# Patient Record
Sex: MALE | Race: WHITE | Employment: FULL TIME | URBAN - METROPOLITAN AREA
[De-identification: names, ages, dates, MRNs, and addresses within clinical notes are randomized per-mention and may not be internally consistent; named-entity substitution may affect disease eponyms.]

---

## 2018-02-08 ENCOUNTER — OFFICE VISIT (OUTPATIENT)
Dept: FAMILY MEDICINE CLINIC | Age: 43
End: 2018-02-08
Payer: COMMERCIAL

## 2018-02-08 VITALS
OXYGEN SATURATION: 98 % | TEMPERATURE: 98.2 F | SYSTOLIC BLOOD PRESSURE: 126 MMHG | HEIGHT: 68 IN | WEIGHT: 197 LBS | DIASTOLIC BLOOD PRESSURE: 78 MMHG | BODY MASS INDEX: 29.86 KG/M2 | RESPIRATION RATE: 16 BRPM | HEART RATE: 67 BPM

## 2018-02-08 DIAGNOSIS — J06.9 ACUTE URI: Primary | ICD-10-CM

## 2018-02-08 PROCEDURE — 87804 INFLUENZA ASSAY W/OPTIC: CPT | Performed by: FAMILY MEDICINE

## 2018-02-08 PROCEDURE — 99213 OFFICE O/P EST LOW 20 MIN: CPT | Performed by: FAMILY MEDICINE

## 2018-02-08 RX ORDER — AZITHROMYCIN 250 MG/1
250 TABLET, FILM COATED ORAL DAILY
Qty: 6 TABLET | Refills: 0 | Status: SHIPPED | OUTPATIENT
Start: 2018-02-08 | End: 2018-02-13

## 2018-02-08 ASSESSMENT — ENCOUNTER SYMPTOMS
EYE DISCHARGE: 0
CHEST TIGHTNESS: 0
WHEEZING: 0
SINUS PRESSURE: 0
SHORTNESS OF BREATH: 0
VOICE CHANGE: 0
CONSTIPATION: 0
SORE THROAT: 0
EYE ITCHING: 0
VOMITING: 0
COUGH: 1
SINUS PAIN: 0
ABDOMINAL PAIN: 0
RHINORRHEA: 0
DIARRHEA: 0
EYE REDNESS: 0
NAUSEA: 0
BACK PAIN: 0
TROUBLE SWALLOWING: 0
HEMOPTYSIS: 0

## 2018-02-08 NOTE — PROGRESS NOTES
[address] [facility] WALK-IN FAMILY MEDICINE   [address] [facility]  [address] [facility] [address]  Dept: [phone]  Dept Fax: [phone]    Sherine Pulido is a 36 y.o. male who presents today for his medical conditions/complaints as noted below. Sherine Pulido is c/o of Cough (cough and congestion, ear fullness x 3 days)        HPI:     Cough   This is a new problem. The current episode started in the past 7 days (4 days ago). The problem has been unchanged. The problem occurs every few minutes. The cough is non-productive. Associated symptoms include ear congestion and a fever. Pertinent negatives include no chest pain, chills, ear pain, eye redness, headaches, hemoptysis, myalgias, nasal congestion, postnasal drip, rash, rhinorrhea, sore throat, shortness of breath, sweats or wheezing. Associated symptoms comments: Low grade temp. . Nothing aggravates the symptoms. He has tried nothing for the symptoms. There is no history of asthma, bronchitis, environmental allergies or pneumonia.        Past Medical History:   Diagnosis Date    Foot fracture, right 05/2016    right great toe fracture    Left hand fracture 05/2016    5th MCP      Past Surgical History:   Procedure Laterality Date    HAND SURGERY Left 05/18/2016    SINUS SURGERY  2012    nasal septum repair, nasal polyps    VASECTOMY  2011       Family History   Problem Relation Age of Onset    Other Mother      Myastenia Gravis, WPW, autoimmune disorder      Other Father      adopted    Other Brother      polymyositis       Social History   Substance Use Topics    Smoking status: Never Smoker    Smokeless tobacco: Never Used    Alcohol use Yes      Comment: occ      Current Outpatient Prescriptions   Medication Sig Dispense Refill    azithromycin (ZITHROMAX Z-TACOS) 250 MG tablet Take 1 tablet by mouth daily for 5 days Take 2 tablets by mouth on the first day and then one tablet daily for the next 4 days 6 tablet 0    sodium

## 2018-02-08 NOTE — PATIENT INSTRUCTIONS
limit fluids, talk with your doctor before you increase the amount of fluids you drink. · Take an over-the-counter pain medicine, such as acetaminophen (Tylenol), ibuprofen (Advil, Motrin), or naproxen (Aleve). Read and follow all instructions on the label. · Before you use cough and cold medicines, check the label. These medicines may not be safe for young children or for people with certain health problems. · Be careful when taking over-the-counter cold or flu medicines and Tylenol at the same time. Many of these medicines have acetaminophen, which is Tylenol. Read the labels to make sure that you are not taking more than the recommended dose. Too much acetaminophen (Tylenol) can be harmful. · Get plenty of rest.  · Do not smoke or allow others to smoke around you. If you need help quitting, talk to your doctor about stop-smoking programs and medicines. These can increase your chances of quitting for good. When should you call for help? Call 911 anytime you think you may need emergency care. For example, call if:  ? · You have severe trouble breathing. ?Call your doctor now or seek immediate medical care if:  ? · You seem to be getting much sicker. ? · You have new or worse trouble breathing. ? · You have a new or higher fever. ? · You have a new rash. ? Watch closely for changes in your health, and be sure to contact your doctor if:  ? · You have a new symptom, such as a sore throat, an earache, or sinus pain. ? · You cough more deeply or more often, especially if you notice more mucus or a change in the color of your mucus. ? · You do not get better as expected. Where can you learn more? Go to https://MyCosmikdharaeb.Meebler. org and sign in to your iVillage account. Enter Y458 in the Newshubby box to learn more about \"Upper Respiratory Infection (Cold): Care Instructions. \"     If you do not have an account, please click on the \"Sign Up Now\" link. Current as of:  May 12,

## 2018-07-05 ENCOUNTER — OFFICE VISIT (OUTPATIENT)
Dept: FAMILY MEDICINE CLINIC | Age: 43
End: 2018-07-05
Payer: COMMERCIAL

## 2018-07-05 VITALS
HEIGHT: 68 IN | DIASTOLIC BLOOD PRESSURE: 76 MMHG | SYSTOLIC BLOOD PRESSURE: 118 MMHG | OXYGEN SATURATION: 98 % | WEIGHT: 195 LBS | HEART RATE: 89 BPM | BODY MASS INDEX: 29.55 KG/M2 | TEMPERATURE: 98.4 F

## 2018-07-05 DIAGNOSIS — H66.90 ACUTE OTITIS MEDIA, UNSPECIFIED OTITIS MEDIA TYPE: ICD-10-CM

## 2018-07-05 DIAGNOSIS — R11.0 NAUSEA: Primary | ICD-10-CM

## 2018-07-05 LAB
INFLUENZA A ANTIBODY: NEGATIVE
INFLUENZA B ANTIBODY: NEGATIVE

## 2018-07-05 PROCEDURE — 1036F TOBACCO NON-USER: CPT | Performed by: FAMILY MEDICINE

## 2018-07-05 PROCEDURE — 99213 OFFICE O/P EST LOW 20 MIN: CPT | Performed by: FAMILY MEDICINE

## 2018-07-05 PROCEDURE — 87804 INFLUENZA ASSAY W/OPTIC: CPT | Performed by: FAMILY MEDICINE

## 2018-07-05 PROCEDURE — G8427 DOCREV CUR MEDS BY ELIG CLIN: HCPCS | Performed by: FAMILY MEDICINE

## 2018-07-05 PROCEDURE — G8419 CALC BMI OUT NRM PARAM NOF/U: HCPCS | Performed by: FAMILY MEDICINE

## 2018-07-05 RX ORDER — ONDANSETRON 4 MG/1
4 TABLET, FILM COATED ORAL EVERY 8 HOURS PRN
Qty: 8 TABLET | Refills: 0 | Status: SHIPPED | OUTPATIENT
Start: 2018-07-05 | End: 2018-07-13

## 2018-07-05 RX ORDER — ONDANSETRON 4 MG/1
4 TABLET, ORALLY DISINTEGRATING ORAL ONCE
Status: COMPLETED | OUTPATIENT
Start: 2018-07-05 | End: 2018-07-05

## 2018-07-05 RX ORDER — AZITHROMYCIN 250 MG/1
TABLET, FILM COATED ORAL
Qty: 1 PACKET | Refills: 0 | Status: SHIPPED | OUTPATIENT
Start: 2018-07-05 | End: 2018-07-15

## 2018-07-05 RX ADMIN — ONDANSETRON 4 MG: 4 TABLET, ORALLY DISINTEGRATING ORAL at 12:01

## 2018-07-05 ASSESSMENT — PATIENT HEALTH QUESTIONNAIRE - PHQ9
SUM OF ALL RESPONSES TO PHQ9 QUESTIONS 1 & 2: 0
1. LITTLE INTEREST OR PLEASURE IN DOING THINGS: 0
SUM OF ALL RESPONSES TO PHQ QUESTIONS 1-9: 0
2. FEELING DOWN, DEPRESSED OR HOPELESS: 0

## 2018-07-05 ASSESSMENT — ENCOUNTER SYMPTOMS
NAUSEA: 1
SINUS PAIN: 0
CHEST TIGHTNESS: 0
VISUAL CHANGE: 0
ABDOMINAL PAIN: 1
BACK PAIN: 0
SORE THROAT: 0
VOMITING: 0
COUGH: 0
WHEEZING: 0
SHORTNESS OF BREATH: 0
DIARRHEA: 0
RHINORRHEA: 0
CHANGE IN BOWEL HABIT: 0
SINUS PRESSURE: 0
EYES NEGATIVE: 1

## 2018-07-05 NOTE — PROGRESS NOTES
Negative for change in bowel habit, diarrhea and vomiting. Genitourinary: Negative. Musculoskeletal: Positive for myalgias. Negative for back pain and neck pain. Skin: Negative for rash. Neurological: Negative for dizziness, light-headedness and headaches. Objective:     Physical Exam   Constitutional: He is oriented to person, place, and time. He appears well-developed and well-nourished. No distress. HENT:   Head: Normocephalic. Right Ear: External ear normal. A middle ear effusion is present. Left Ear: External ear normal. Tympanic membrane is erythematous. A middle ear effusion is present. Nose: Nose normal.   Mouth/Throat: Oropharynx is clear and moist. No oropharyngeal exudate. Eyes: Conjunctivae and EOM are normal. Pupils are equal, round, and reactive to light. Right eye exhibits no discharge. Left eye exhibits no discharge. Neck: Normal range of motion. Neck supple. Cardiovascular: Normal rate, regular rhythm and normal heart sounds. No murmur heard. Pulmonary/Chest: Effort normal and breath sounds normal. No respiratory distress. He has no wheezes. He has no rales. Abdominal: Soft. Bowel sounds are normal. He exhibits no distension and no mass. There is no tenderness. Musculoskeletal: Normal range of motion. He exhibits no edema or tenderness. Lymphadenopathy:     He has no cervical adenopathy. Neurological: He is alert and oriented to person, place, and time. No cranial nerve deficit. Coordination normal.   Skin: Skin is warm. No rash noted. He is not diaphoretic. Psychiatric: He has a normal mood and affect. His behavior is normal. Judgment and thought content normal.   Nursing note and vitals reviewed. /76   Pulse 89   Temp 98.4 °F (36.9 °C) (Oral)   Ht 5' 7.99\" (1.727 m)   Wt 195 lb (88.5 kg)   SpO2 98%   BMI 29.66 kg/m²     Assessment:       Diagnosis Orders   1.  Nausea  POCT Influenza A/B    ondansetron (ZOFRAN-ODT) disintegrating tablet 4 mg

## 2018-07-05 NOTE — PATIENT INSTRUCTIONS
Patient Education        Nausea and Vomiting: Care Instructions  Your Care Instructions    When you are nauseated, you may feel weak and sweaty and notice a lot of saliva in your mouth. Nausea often leads to vomiting. Most of the time you do not need to worry about nausea and vomiting, but they can be signs of other illnesses. Two common causes of nausea and vomiting are stomach flu and food poisoning. Nausea and vomiting from viral stomach flu will usually start to improve within 24 hours. Nausea and vomiting from food poisoning may last from 12 to 48 hours. The doctor has checked you carefully, but problems can develop later. If you notice any problems or new symptoms, get medical treatment right away. Follow-up care is a key part of your treatment and safety. Be sure to make and go to all appointments, and call your doctor if you are having problems. It's also a good idea to know your test results and keep a list of the medicines you take. How can you care for yourself at home? · To prevent dehydration, drink plenty of fluids, enough so that your urine is light yellow or clear like water. Choose water and other caffeine-free clear liquids until you feel better. If you have kidney, heart, or liver disease and have to limit fluids, talk with your doctor before you increase the amount of fluids you drink. · Rest in bed until you feel better. · When you are able to eat, try clear soups, mild foods, and liquids until all symptoms are gone for 12 to 48 hours. Other good choices include dry toast, crackers, cooked cereal, and gelatin dessert, such as Jell-O. When should you call for help? Call 911 anytime you think you may need emergency care. For example, call if:    · You passed out (lost consciousness).    Call your doctor now or seek immediate medical care if:    · You have symptoms of dehydration, such as:  ¨ Dry eyes and a dry mouth. ¨ Passing only a little dark urine.   ¨ Feeling thirstier than usual.   · You have new or worsening belly pain.     · You have a new or higher fever.     · You vomit blood or what looks like coffee grounds.    Watch closely for changes in your health, and be sure to contact your doctor if:    · You have ongoing nausea and vomiting.     · Your vomiting is getting worse.     · Your vomiting lasts longer than 2 days.     · You are not getting better as expected. Where can you learn more? Go to https://WotopepicParticle Code.Extra Life. org and sign in to your TeamLease Services account. Enter [id number] in the Inway Studios box to learn more about \"Nausea and Vomiting: Care Instructions. \"     If you do not have an account, please click on the \"Sign Up Now\" link. Current as of: November 20, 2017  Content Version: 11.6  © 1596-6247 [employer]ical. Care instructions adapted under license by [facility] ([facility]). If you have questions about a medical condition or this instruction, always ask your healthcare professional. [place] [address] any warranty or liability for your use of this information. Patient Education        Ear Infection (Otitis Media): Care Instructions  Your Care Instructions    An ear infection may start with a cold and affect the middle ear (otitis media). It can hurt a lot. Most ear infections clear up on their own in a couple of days. Most often you will not need antibiotics. This is because many ear infections are caused by a virus. Antibiotics don't work against a virus. Regular doses of pain medicines are the best way to reduce your fever and help you feel better. Follow-up care is a key part of your treatment and safety. Be sure to make and go to all appointments, and call your doctor if you are having problems. It's also a good idea to know your test results and keep a list of the medicines you take. How can you care for yourself at home? · Take pain medicines exactly as directed.   ¨ If the doctor gave you a prescription medicine for pain, take it as prescribed. ¨ If you are not taking a prescription pain medicine, take an over-the-counter medicine, such as acetaminophen (Tylenol), ibuprofen (Advil, Motrin), or naproxen (Aleve). Read and follow all instructions on the label. ¨ Do not take two or more pain medicines at the same time unless the doctor told you to. Many pain medicines have acetaminophen, which is Tylenol. Too much acetaminophen (Tylenol) can be harmful. · Plan to take a full dose of pain reliever before bedtime. Getting enough sleep will help you get better. · Try a warm, moist washcloth on the ear. It may help relieve pain. · If your doctor prescribed antibiotics, take them as directed. Do not stop taking them just because you feel better. You need to take the full course of antibiotics. When should you call for help? Call your doctor now or seek immediate medical care if:    · You have new or increasing ear pain.     · You have new or increasing pus or blood draining from your ear.     · You have a fever with a stiff neck or a severe headache.    Watch closely for changes in your health, and be sure to contact your doctor if:    · You have new or worse symptoms.     · You are not getting better after taking an antibiotic for 2 days. Where can you learn more? Go to https://Safeguard Interactive.Accelerated Vision Group. org and sign in to your Mix & Meet account. Enter N644 in the [facility] box to learn more about \"Ear Infection (Otitis Media): Care Instructions. \"     If you do not have an account, please click on the \"Sign Up Now\" link. Current as of: May 12, 2017  Content Version: 11.6  © 6178-2195 Gowalla, Incorporated. Care instructions adapted under license by [facility] ([facility]). If you have questions about a medical condition or this instruction, always ask your healthcare professional. [place] [address] any warranty or liability for your use of this information.      Please go to the Emergency department if you feel worse.

## 2018-07-05 NOTE — LETTER
[place] [address] [place] [address]  Nurys Smith  Phone: [phone]  Fax: [phone]    Teresa Louis MD        July 5, 2018     Patient: Dilshad Patel   YOB: 1975   Date of Visit: 7/5/2018       To Whom It May Concern: It is my medical opinion that Irvin Talamantes should remain out of work on 7/5/18 through 7/6/18. If you have any questions or concerns, please don't hesitate to call.     Sincerely,        Teresa Louis MD

## 2019-04-18 ENCOUNTER — OFFICE VISIT (OUTPATIENT)
Dept: FAMILY MEDICINE CLINIC | Age: 44
End: 2019-04-18
Payer: COMMERCIAL

## 2019-04-18 VITALS
RESPIRATION RATE: 16 BRPM | HEART RATE: 67 BPM | TEMPERATURE: 98.3 F | BODY MASS INDEX: 29.7 KG/M2 | OXYGEN SATURATION: 95 % | HEIGHT: 68 IN | DIASTOLIC BLOOD PRESSURE: 74 MMHG | SYSTOLIC BLOOD PRESSURE: 124 MMHG | WEIGHT: 196 LBS

## 2019-04-18 DIAGNOSIS — M77.11 LATERAL EPICONDYLITIS OF RIGHT ELBOW: Primary | ICD-10-CM

## 2019-04-18 PROCEDURE — 1036F TOBACCO NON-USER: CPT | Performed by: FAMILY MEDICINE

## 2019-04-18 PROCEDURE — 99213 OFFICE O/P EST LOW 20 MIN: CPT | Performed by: FAMILY MEDICINE

## 2019-04-18 PROCEDURE — G8427 DOCREV CUR MEDS BY ELIG CLIN: HCPCS | Performed by: FAMILY MEDICINE

## 2019-04-18 PROCEDURE — G8419 CALC BMI OUT NRM PARAM NOF/U: HCPCS | Performed by: FAMILY MEDICINE

## 2019-04-18 RX ORDER — METHYLPREDNISOLONE 4 MG/1
TABLET ORAL
Qty: 1 KIT | Refills: 0 | Status: SHIPPED | OUTPATIENT
Start: 2019-04-18 | End: 2020-02-06 | Stop reason: ALTCHOICE

## 2019-04-18 ASSESSMENT — ENCOUNTER SYMPTOMS
ABDOMINAL PAIN: 0
EYES NEGATIVE: 1
CHEST TIGHTNESS: 0
BACK PAIN: 0
WHEEZING: 0
COUGH: 0
DIARRHEA: 0
SHORTNESS OF BREATH: 0
NAUSEA: 0
VOMITING: 0

## 2019-04-18 ASSESSMENT — PATIENT HEALTH QUESTIONNAIRE - PHQ9
SUM OF ALL RESPONSES TO PHQ QUESTIONS 1-9: 0
2. FEELING DOWN, DEPRESSED OR HOPELESS: 0
SUM OF ALL RESPONSES TO PHQ9 QUESTIONS 1 & 2: 0
SUM OF ALL RESPONSES TO PHQ QUESTIONS 1-9: 0
1. LITTLE INTEREST OR PLEASURE IN DOING THINGS: 0

## 2019-04-18 NOTE — PROGRESS NOTES
[address] [facility] WALK-IN FAMILY MEDICINE   [address] [facility] [address]  Dept: [phone]  Dept Fax: [phone]    Elroy Reed is a 39 y.o. male who presents today for his medicalconditions/complaints as noted below. Elroy Reed is c/o of Arm Pain (rt arm ( elbow area ) pain x two months - constant pain when moving - no known injury )        HPI:      Right handed. Arm Pain    Incident onset: 4 months. There was no injury mechanism. The pain is present in the right elbow. The quality of the pain is described as aching. The pain does not radiate. The pain is at a severity of 7/10. The pain is moderate. Pain course: No pain when not in motion. Pertinent negatives include no chest pain, muscle weakness, numbness or tingling. The symptoms are aggravated by movement, lifting and palpation. The treatment provided no relief. Past Medical History:   Diagnosis Date    Foot fracture, right 05/2016    right great toe fracture    Left hand fracture 05/2016    5th MCP      Past Surgical History:   Procedure Laterality Date    HAND SURGERY Left 05/18/2016    SINUS SURGERY  2012    nasal septum repair, nasal polyps    VASECTOMY  2011       Family History   Problem Relation Age of Onset    Other Mother         Myastenia Gravis, WPW, autoimmune disorder      Other Father         adopted    Other Brother         polymyositis       Social History     Tobacco Use    Smoking status: Never Smoker    Smokeless tobacco: Never Used   Substance Use Topics    Alcohol use: Yes     Comment: occ      Current Outpatient Medications   Medication Sig Dispense Refill    methylPREDNISolone (MEDROL, TACOS,) 4 MG tablet Take by mouth. 1 kit 0     No current facility-administered medications for this visit.       No Known Allergies       Health Maintenance   Topic Date Due    HIV screen  04/26/1989    DTaP/Tdap/Td vaccine (1 - Tdap) 04/26/1993    Lipid screen  04/26/2014    is warm. No rash noted. He is not diaphoretic. Psychiatric: He has a normal mood and affect. His behavior is normal. Judgment and thought content normal.   Nursing note and vitals reviewed. /74 (Site: Left Upper Arm, Position: Sitting, Cuff Size: Medium Adult)   Pulse 67   Temp 98.3 °F (36.8 °C) (Oral)   Resp 16   Ht 5' 8\" (1.727 m)   Wt 196 lb (88.9 kg)   SpO2 95%   BMI 29.80 kg/m²     Assessment:       Diagnosis Orders   1. Lateral epicondylitis of right elbow  methylPREDNISolone (MEDROL, TACOS,) 4 MG tablet       Plan:    Consider surgical consult for neck lump. He was advised to see an orthopedic specialist and have Xrays if his elbow  symptoms persisted. An elbow brace support was advised. A trial of hot and cold packs was advised. No orders of the defined types were placed in this encounter. Orders Placed This Encounter   Medications    methylPREDNISolone (MEDROL, TACOS,) 4 MG tablet     Sig: Take by mouth. Dispense:  1 kit     Refill:  0      Patient given educational materials - see patient instructions. Discusseduse, benefit, and side effects of prescribed medications. All patient questionsanswered. Pt voiced understanding. Reviewed health maintenance. Instructed tocontinue current medications, diet and exercise. Patient agreed with treatmentplan. Follow up as directed.      Electronically signed by Joshua Shepard MD on 4/18/2019 at 11:51 AM

## 2019-04-18 NOTE — PATIENT INSTRUCTIONS
Patient Education        Tennis Elbow: Care Instructions  Your Care Instructions    Tennis elbow is soreness or pain on the outer part of the elbow. The pain occurs when the tendon is stretched and becomes irritated by repeated twisting of the hand, wrist, and forearm. A tendon is a tough tissue that connects muscle to bone. This injury is common in tennis players. But you also can get it from many activities that work the same muscles. Examples include gardening, painting, and using tools. Tennis elbow usually heals with rest and treatment at home. Follow-up care is a key part of your treatment and safety. Be sure to make and go to all appointments, and call your doctor if you are having problems. It's also a good idea to know your test results and keep a list of the medicines you take. How can you care for yourself at home?    · Rest your fingers, wrist, and forearm. Try to stop or reduce any activity that causes elbow pain. You may have to rest your arm for weeks to months. Follow your doctor's directions for how long to rest.     · Put ice or a cold pack on your elbow for 10 to 20 minutes at a time. Try to do this every 1 to 2 hours for the next 3 days (when you are awake) or until the swelling goes down. Put a thin cloth between the ice and your skin.     · If your doctor gave you a brace or splint, use it as directed. A \"counterforce\" brace is a strap around your forearm, just below your elbow. It may ease the pressure on the tendon and spread force throughout your arm.     · Prop up your elbow on pillows to help reduce swelling.     · Follow your doctor's or physical therapist's directions for exercise.     · Return to your usual activities slowly.     · Try to prevent the problem. Learn the best techniques for your sport. For example, make sure the  on your tennis racquet is not too big for your hand.  Try not to hit a tennis ball late in your swing.     · Think about asking your employer about new ways of doing your job if your elbow pain is caused by something you do at work. Medicines    · Be safe with medicines. Read and follow all instructions on the label. ? If the doctor gave you a prescription medicine for pain, take it as prescribed. ? If you are not taking a prescription pain medicine, ask your doctor if you can take an over-the-counter medicine. When should you call for help? Call your doctor now or seek immediate medical care if:    · Your pain is worse.     · You cannot bend your elbow normally.     · Your arm or hand is cool or pale or changes color.     · You have tingling, weakness, or numbness in your hand and fingers.    Watch closely for changes in your health, and be sure to contact your doctor if:    · You have work problems caused by your elbow pain.     · Your pain is not better after 2 weeks. Where can you learn more? Go to https://TakklepeCloakroomeb.Social Pulse. org and sign in to your Vixar account. Enter 0699 [id number] [phone] in the Criteo box to learn more about \"Tennis Elbow: Care Instructions. \"     If you do not have an account, please click on the \"Sign Up Now\" link. Current as of: September 20, 2018  Content Version: 11.9  © 5946-3319 Reimage, Incorporated. Care instructions adapted under license by [facility] ([facility]). If you have questions about a medical condition or this instruction, always ask your healthcare professional. [place] [address] any warranty or liability for your use of this information. Patient Education         Tennis Elbow: Exercises  Your Care Instructions  Here are some examples of typical rehabilitation exercises for your condition. Start each exercise slowly. Ease off the exercise if you start to have pain. Your doctor or physical therapist will tell you when you can start these exercises and which ones will work best for you. How to do the exercises  Wrist flexor stretch    1.  Extend your arm in front of you with your palm up. 2. Bend your wrist, pointing your hand toward the floor. 3. With your other hand, gently bend your wrist farther until you feel a mild to moderate stretch in your forearm. 4. Hold for at least 15 to 30 seconds. Repeat 2 to 4 times. Wrist extensor stretch    1. Repeat steps 1 to 4 of the stretch above but begin with your extended hand palm down. Ball or sock squeeze    1. Hold a tennis ball (or a rolled-up sock) in your hand. 2. Make a fist around the ball (or sock) and squeeze. 3. Hold for about 6 seconds, and then relax for up to 10 seconds. 4. Repeat 8 to 12 times. 5. Switch the ball (or sock) to your other hand and do 8 to 12 times. Wrist deviation    1. Sit so that your arm is supported but your hand hangs off the edge of a flat surface, such as a table. 2. Hold your hand out like you are shaking hands with someone. 3. Move your hand up and down. 4. Repeat this motion 8 to 12 times. 5. Switch arms. 6. Try to do this exercise twice with each hand. Wrist curls    1. Place your forearm on a table with your hand hanging over the edge of the table, palm up. 2. Place a 1- to 2-pound weight in your hand. This may be a dumbbell, a can of food, or a filled water bottle. 3. Slowly raise and lower the weight while keeping your forearm on the table and your palm facing up. 4. Repeat this motion 8 to 12 times. 5. Switch arms, and do steps 1 through 4.  6. Repeat with your hand facing down toward the floor. Switch arms. Biceps curls    1. Sit leaning forward with your legs slightly spread and your left hand on your left thigh. 2. Place your right elbow on your right thigh, and hold the weight with your forearm horizontal.  3. Slowly curl the weight up and toward your chest.  4. Repeat this motion 8 to 12 times. 5. Switch arms, and do steps 1 through 4. Follow-up care is a key part of your treatment and safety.  Be sure to make and go to all appointments, and call your doctor if you are having problems. It's also a good idea to know your test results and keep a list of the medicines you take. Where can you learn more? Go to https://chpepiceweb.Nevro. org and sign in to your OpenFint account. Enter A925 in the Citylabs box to learn more about \"Tennis Elbow: Exercises. \"     If you do not have an account, please click on the \"Sign Up Now\" link. Current as of: September 20, 2018  Content Version: 11.9  © 6361-0752 [employer] Office Energy [employer], Incorporated. Care instructions adapted under license by [facility] ([facility]). If you have questions about a medical condition or this instruction, always ask your healthcare professional. [address] any warranty or liability for your use of this information.

## 2020-02-06 ENCOUNTER — OFFICE VISIT (OUTPATIENT)
Dept: FAMILY MEDICINE CLINIC | Age: 45
End: 2020-02-06
Payer: COMMERCIAL

## 2020-02-06 VITALS
OXYGEN SATURATION: 98 % | WEIGHT: 185 LBS | RESPIRATION RATE: 16 BRPM | HEIGHT: 68 IN | HEART RATE: 71 BPM | BODY MASS INDEX: 28.04 KG/M2 | TEMPERATURE: 98.1 F | SYSTOLIC BLOOD PRESSURE: 133 MMHG | DIASTOLIC BLOOD PRESSURE: 86 MMHG

## 2020-02-06 PROCEDURE — G8419 CALC BMI OUT NRM PARAM NOF/U: HCPCS | Performed by: FAMILY MEDICINE

## 2020-02-06 PROCEDURE — G8427 DOCREV CUR MEDS BY ELIG CLIN: HCPCS | Performed by: FAMILY MEDICINE

## 2020-02-06 PROCEDURE — 1036F TOBACCO NON-USER: CPT | Performed by: FAMILY MEDICINE

## 2020-02-06 PROCEDURE — 99213 OFFICE O/P EST LOW 20 MIN: CPT | Performed by: FAMILY MEDICINE

## 2020-02-06 PROCEDURE — G8484 FLU IMMUNIZE NO ADMIN: HCPCS | Performed by: FAMILY MEDICINE

## 2020-02-06 RX ORDER — METHYLPREDNISOLONE 4 MG/1
TABLET ORAL
Qty: 1 KIT | Refills: 0 | Status: SHIPPED | OUTPATIENT
Start: 2020-02-06 | End: 2022-01-20 | Stop reason: ALTCHOICE

## 2020-02-06 RX ORDER — TRIAMCINOLONE ACETONIDE 0.25 MG/G
CREAM TOPICAL
Qty: 80 G | Refills: 0 | Status: SHIPPED | OUTPATIENT
Start: 2020-02-06 | End: 2022-03-16

## 2020-02-06 ASSESSMENT — ENCOUNTER SYMPTOMS
FACIAL SWELLING: 1
DIARRHEA: 0
BACK PAIN: 0
RHINORRHEA: 0
EYE REDNESS: 0
EYE ITCHING: 0
EYE DISCHARGE: 0
EYE PAIN: 0
SINUS PRESSURE: 0
WHEEZING: 0
SHORTNESS OF BREATH: 0
SINUS PAIN: 0
SORE THROAT: 0
CHEST TIGHTNESS: 0
ABDOMINAL PAIN: 0
NAUSEA: 0
VOMITING: 0
COUGH: 0

## 2020-02-06 ASSESSMENT — PATIENT HEALTH QUESTIONNAIRE - PHQ9
SUM OF ALL RESPONSES TO PHQ QUESTIONS 1-9: 0
SUM OF ALL RESPONSES TO PHQ9 QUESTIONS 1 & 2: 0
SUM OF ALL RESPONSES TO PHQ QUESTIONS 1-9: 0
1. LITTLE INTEREST OR PLEASURE IN DOING THINGS: 0
2. FEELING DOWN, DEPRESSED OR HOPELESS: 0

## 2020-02-06 NOTE — PATIENT INSTRUCTIONS
Patient Education        Rash: Care Instructions  Your Care Instructions  A rash is any irritation or inflammation of the skin. Rashes have many possible causes, including allergy, infection, illness, heat, and emotional stress. Follow-up care is a key part of your treatment and safety. Be sure to make and go to all appointments, and call your doctor if you are having problems. It's also a good idea to know your test results and keep a list of the medicines you take. How can you care for yourself at home? · Wash the area with water only. Soap can make dryness and itching worse. Pat dry. · Put cold, wet cloths on the rash to reduce itching. · Keep cool, and stay out of the sun. · Leave the rash open to the air as much of the time as possible. · Sometimes petroleum jelly (Vaseline) can help relieve the discomfort caused by a rash. A moisturizing lotion, such as Cetaphil, also may help. Calamine lotion may help for rashes caused by contact with something (such as a plant or soap) that irritated the skin. Use it 3 or 4 times a day. · If your doctor prescribed a cream, use it as directed. If your doctor prescribed medicine, take it exactly as directed. · If your rash itches so badly that it interferes with your normal activities, take an over-the-counter antihistamine, such as diphenhydramine (Benadryl) or loratadine (Claritin). Read and follow all instructions on the label. When should you call for help? Call your doctor now or seek immediate medical care if:    · You have signs of infection, such as:  ? Increased pain, swelling, warmth, or redness. ? Red streaks leading from the area. ? Pus draining from the area. ? A fever.     · You have joint pain along with the rash.    Watch closely for changes in your health, and be sure to contact your doctor if:    · Your rash is changing or getting worse. For example, call if you have pain along with the rash, the rash is spreading, or you have new blisters.   · You do not get better after 1 week. Where can you learn more? Go to https://chpepiceweb.[employer]. org and sign in to your [employer] account. Enter Q462 in the [facility] box to learn more about \"Rash: Care Instructions. \"     If you do not have an account, please click on the \"Sign Up Now\" link. Current as of: April 1, 2019  Content Version: 12.3  © 7371-7409 Healthwise, Open[employer]. Care instructions adapted under license by [facility] ([facility]). If you have questions about a medical condition or this instruction, always ask your healthcare professional. [address] any warranty or liability for your use of this information. Patient Education        Hives: Care Instructions  Your Care Instructions  Hives are raised, red, itchy patches of skin. They are also called wheals or welts. They usually have red borders and pale centers. Hives range in size from ¼ inch to 3 inches or more across. They may seem to move from place to place on the skin. Several hives may form a large area of raised, red skin. You can get hives after an insect sting, after taking medicine or eating certain foods, or because of infection or stress. Other causes include plants, things you breathe in, makeup, heat, cold, sunlight, and latex. You cannot spread hives to other people. Hives may last a few minutes or a few days, but a single spot may last less than 36 hours. Follow-up care is a key part of your treatment and safety. Be sure to make and go to all appointments, and call your doctor if you are having problems. It's also a good idea to know your test results and keep a list of the medicines you take. How can you care for yourself at home? · Avoid whatever you think may have caused your hives, such as a certain food or medicine. However, you may not know the cause. · Put a cool, wet towel on the area to relieve itching.   · Take an over-the-counter antihistamine, such as diphenhydramine (Benadryl), cetirizine (Zyrtec), or loratadine (Claritin), to help stop the hives and calm the itching. Read and follow directions on the label. These medicines can make you feel sleepy. Do not drive while using them. · Stay away from strong soaps, detergents, and chemicals. These can make itching worse. When should you call for help? Call 911 anytime you think you may need emergency care. For example, call if:    · You have symptoms of a severe allergic reaction. These may include:  ? Sudden raised, red areas (hives) all over your body. ? Swelling of the throat, mouth, lips, or tongue. ? Trouble breathing. ? Passing out (losing consciousness). Or you may feel very lightheaded or suddenly feel weak, confused, or restless.    Call your doctor now or seek immediate medical care if:    · You have symptoms of an allergic reaction, such as:  ? A rash or hives (raised, red areas on the skin). ? Itching. ? Swelling. ? Belly pain, nausea, or vomiting.     · You get hives after you start a new medicine.     · Hives have not gone away after 24 hours.    Watch closely for changes in your health, and be sure to contact your doctor if:    · You do not get better as expected. Where can you learn more? Go to https://Lightning Gamingpepiceweb.Littlecast. org and sign in to your [employer] account. Enter Y577 in the [employer] box to learn more about \"Hives: Care Instructions. \"     If you do not have an account, please click on the \"Sign Up Now\" link. Current as of: June 26, 2019  Content Version: 12.3  © 7826-8410 Healthwise, Incorporated. Care instructions adapted under license by [facility] ([facility]). If you have questions about a medical condition or this instruction, always ask your healthcare professional. [address] any warranty or liability for your use of this information.

## 2020-02-06 NOTE — PROGRESS NOTES
Used   Substance Use Topics    Alcohol use: Yes     Comment: occ      Current Outpatient Medications   Medication Sig Dispense Refill    methylPREDNISolone (MEDROL, TACOS,) 4 MG tablet Take by mouth. 1 kit 0    triamcinolone (KENALOG) 0.025 % cream Apply Topically twice a day as needed 80 g 0     No current facility-administered medications for this visit. No Known Allergies       Health Maintenance   Topic Date Due    DTaP/Tdap/Td vaccine (1 - Tdap) 04/26/1985    HIV screen  04/26/1989    Lipid screen  04/26/2014    Diabetes screen  04/26/2014    Flu vaccine (1) 09/01/2019    Shingles Vaccine (1 of 2) 04/26/2024    Hepatitis A vaccine  Aged Out    Hepatitis B vaccine  Aged Out    Hib vaccine  Aged Out    Meningococcal (ACWY) vaccine  Aged Out    Pneumococcal 0-64 years Vaccine  Aged Out       Subjective:      Review of Systems   Constitutional: Negative for appetite change, chills, diaphoresis, fatigue and fever. HENT: Positive for facial swelling. Negative for congestion, ear pain, rhinorrhea, sinus pressure, sinus pain, sneezing, sore throat and tinnitus. Mild swelling/puffiness cheeks. No tongue swelling. Eyes: Negative for pain, discharge, redness, itching and visual disturbance. Respiratory: Negative for cough, chest tightness, shortness of breath and wheezing. Cardiovascular: Negative for chest pain and palpitations. Gastrointestinal: Negative for abdominal pain, diarrhea, nausea and vomiting. Genitourinary: Negative. Musculoskeletal: Negative for back pain, joint pain, myalgias and neck pain. Skin: Positive for rash. Neurological: Negative for dizziness, light-headedness and headaches. Objective:      Physical Exam  Vitals signs reviewed. Constitutional:       General: He is not in acute distress. Appearance: He is well-developed. He is not diaphoretic. HENT:      Head: Normocephalic.       Right Ear: External ear normal.      Left Ear: External ear

## 2021-02-01 ENCOUNTER — HOSPITAL ENCOUNTER (OUTPATIENT)
Facility: CLINIC | Age: 46
Discharge: HOME | End: 2021-02-03
Payer: COMMERCIAL

## 2021-02-01 ENCOUNTER — OFFICE VISIT (OUTPATIENT)
Dept: FAMILY MEDICINE CLINIC | Age: 46
End: 2021-02-01
Payer: COMMERCIAL

## 2021-02-01 ENCOUNTER — HOSPITAL ENCOUNTER (OUTPATIENT)
Dept: GENERAL RADIOLOGY | Facility: CLINIC | Age: 46
Discharge: HOME | End: 2021-02-03
Payer: COMMERCIAL

## 2021-02-01 VITALS
HEART RATE: 75 BPM | SYSTOLIC BLOOD PRESSURE: 142 MMHG | BODY MASS INDEX: 27.4 KG/M2 | HEIGHT: 69 IN | DIASTOLIC BLOOD PRESSURE: 85 MMHG | WEIGHT: 185 LBS | OXYGEN SATURATION: 97 % | TEMPERATURE: 97.3 F

## 2021-02-01 DIAGNOSIS — M54.41 CHRONIC RIGHT-SIDED LOW BACK PAIN WITH RIGHT-SIDED SCIATICA: ICD-10-CM

## 2021-02-01 DIAGNOSIS — M54.41 ACUTE RIGHT-SIDED LOW BACK PAIN WITH RIGHT-SIDED SCIATICA: Primary | ICD-10-CM

## 2021-02-01 DIAGNOSIS — M54.41 ACUTE RIGHT-SIDED LOW BACK PAIN WITH RIGHT-SIDED SCIATICA: ICD-10-CM

## 2021-02-01 DIAGNOSIS — G89.29 CHRONIC RIGHT-SIDED LOW BACK PAIN WITH RIGHT-SIDED SCIATICA: ICD-10-CM

## 2021-02-01 PROCEDURE — G8419 CALC BMI OUT NRM PARAM NOF/U: HCPCS | Performed by: NURSE PRACTITIONER

## 2021-02-01 PROCEDURE — 72100 X-RAY EXAM L-S SPINE 2/3 VWS: CPT

## 2021-02-01 PROCEDURE — 99214 OFFICE O/P EST MOD 30 MIN: CPT | Performed by: NURSE PRACTITIONER

## 2021-02-01 PROCEDURE — G8484 FLU IMMUNIZE NO ADMIN: HCPCS | Performed by: NURSE PRACTITIONER

## 2021-02-01 PROCEDURE — 96372 THER/PROPH/DIAG INJ SC/IM: CPT | Performed by: NURSE PRACTITIONER

## 2021-02-01 PROCEDURE — 1036F TOBACCO NON-USER: CPT | Performed by: NURSE PRACTITIONER

## 2021-02-01 PROCEDURE — G8427 DOCREV CUR MEDS BY ELIG CLIN: HCPCS | Performed by: NURSE PRACTITIONER

## 2021-02-01 RX ORDER — TRIAMCINOLONE ACETONIDE 40 MG/ML
40 INJECTION, SUSPENSION INTRA-ARTICULAR; INTRAMUSCULAR ONCE
Status: COMPLETED | OUTPATIENT
Start: 2021-02-01 | End: 2021-02-01

## 2021-02-01 RX ORDER — KETOROLAC TROMETHAMINE 30 MG/ML
30 INJECTION, SOLUTION INTRAMUSCULAR; INTRAVENOUS ONCE
Status: COMPLETED | OUTPATIENT
Start: 2021-02-01 | End: 2021-02-01

## 2021-02-01 RX ORDER — CYCLOBENZAPRINE HCL 10 MG
10 TABLET ORAL NIGHTLY PRN
Qty: 10 TABLET | Refills: 0 | Status: SHIPPED | OUTPATIENT
Start: 2021-02-01 | End: 2021-02-11

## 2021-02-01 RX ORDER — PREDNISONE 20 MG/1
40 TABLET ORAL DAILY
Qty: 10 TABLET | Refills: 0 | Status: SHIPPED | OUTPATIENT
Start: 2021-02-01 | End: 2021-02-06

## 2021-02-01 RX ADMIN — KETOROLAC TROMETHAMINE 30 MG: 30 INJECTION, SOLUTION INTRAMUSCULAR; INTRAVENOUS at 13:52

## 2021-02-01 RX ADMIN — TRIAMCINOLONE ACETONIDE 40 MG: 40 INJECTION, SUSPENSION INTRA-ARTICULAR; INTRAMUSCULAR at 13:56

## 2021-02-01 RX ADMIN — KETOROLAC TROMETHAMINE 30 MG: 30 INJECTION, SOLUTION INTRAMUSCULAR; INTRAVENOUS at 13:54

## 2021-02-01 ASSESSMENT — PATIENT HEALTH QUESTIONNAIRE - PHQ9
SUM OF ALL RESPONSES TO PHQ QUESTIONS 1-9: 0
1. LITTLE INTEREST OR PLEASURE IN DOING THINGS: 0
2. FEELING DOWN, DEPRESSED OR HOPELESS: 0
SUM OF ALL RESPONSES TO PHQ9 QUESTIONS 1 & 2: 0

## 2021-02-01 ASSESSMENT — ENCOUNTER SYMPTOMS
BACK PAIN: 1
EYE DISCHARGE: 0
SHORTNESS OF BREATH: 0
ALLERGIC/IMMUNOLOGIC NEGATIVE: 1
VOMITING: 0
SORE THROAT: 0
EYES NEGATIVE: 1
COUGH: 0
DIARRHEA: 0
EYE ITCHING: 0
NAUSEA: 0
ABDOMINAL PAIN: 0
CHEST TIGHTNESS: 0

## 2021-02-01 NOTE — PROGRESS NOTES
[address] [facility]   [facility] Preeti Morel  Dept: [phone]  Dept Fax: [phone]    Robin Travis is a 54 y.o. male who presents today forhis medical conditions/complaints as noted below. Robin Travis is c/o of   Chief Complaint   Patient presents with    Back Pain     back pain,right side of back radiating down right leg,x4 days     HPI:     Back Pain  This is a recurrent problem. The current episode started in the past 7 days (x's 4 days ). The problem occurs constantly. The problem has been gradually worsening since onset. The pain is present in the lumbar spine. The quality of the pain is described as aching and shooting. The pain radiates to the right thigh. The pain is at a severity of 7/10. The pain is moderate. The symptoms are aggravated by lying down, position and standing. Pertinent negatives include no abdominal pain, chest pain, dysuria, fever, headaches or weakness. He has tried NSAIDs and home exercises for the symptoms. The treatment provided mild relief. Developed back pain x's 6 weeks ago playing basketball. Pain subsided. About 4 days developed increased lower back pain. Having shooting pain to lower R leg. Constant dull pain. Intermittent shooting. Denies any numbness or tingling   Relief sitting in a recliner. Taking Ibuprofen PRN- no relief with taking. Did an inversion table yesterday with relief for a few hours and pain came back. Increases with standing and laying down. HX back injury 18-20 years ago due to heavy lifting.      Denies any imaging in the past.     Past Medical History:   Diagnosis Date    Foot fracture, right 05/2016    right great toe fracture    Left hand fracture 05/2016    5th MCP      Past Surgical History:   Procedure Laterality Date    HAND SURGERY Left 05/18/2016    SINUS SURGERY  2012    nasal septum repair, nasal polyps    VASECTOMY  2011       Family History Problem Relation Age of Onset    Other Mother         Myastenia Gravis, WPW, autoimmune disorder      Other Father         adopted    Other Brother         polymyositis       Social History     Tobacco Use    Smoking status: Never Smoker    Smokeless tobacco: Never Used   Substance Use Topics    Alcohol use: Yes     Comment: occ      Current Outpatient Medications   Medication Sig Dispense Refill    predniSONE (DELTASONE) 20 MG tablet Take 2 tablets by mouth daily for 5 days 10 tablet 0    cyclobenzaprine (FLEXERIL) 10 MG tablet Take 1 tablet by mouth nightly as needed for Muscle spasms 10 tablet 0    methylPREDNISolone (MEDROL, TACOS,) 4 MG tablet Take by mouth. (Patient not taking: Reported on 2/1/2021) 1 kit 0    triamcinolone (KENALOG) 0.025 % cream Apply Topically twice a day as needed (Patient not taking: Reported on 2/1/2021) 80 g 0     No current facility-administered medications for this visit. No Known Allergies        Subjective:      Review of Systems   Constitutional: Negative for appetite change, chills, fatigue and fever. HENT: Negative for congestion, postnasal drip and sore throat. Eyes: Negative. Negative for discharge and itching. Respiratory: Negative for cough, chest tightness and shortness of breath. Cardiovascular: Negative for chest pain, palpitations and leg swelling. Gastrointestinal: Negative for abdominal pain, diarrhea, nausea and vomiting. Endocrine: Negative. Genitourinary: Negative for dysuria, frequency and urgency. Musculoskeletal: Positive for back pain. Negative for neck pain and neck stiffness. Lower back pain with radiation down R upper leg    Skin: Negative for rash. Allergic/Immunologic: Negative. Neurological: Negative for dizziness, weakness, light-headedness and headaches. Hematological: Negative for adenopathy. Psychiatric/Behavioral: Negative for confusion. Objective:      Physical Exam  Vitals signs reviewed. Constitutional:       Appearance: He is well-developed. HENT:      Head: Normocephalic. Right Ear: External ear normal.      Left Ear: External ear normal.      Nose: Nose normal.   Eyes:      Conjunctiva/sclera: Conjunctivae normal.      Pupils: Pupils are equal, round, and reactive to light. Neck:      Musculoskeletal: Normal range of motion and neck supple. Cardiovascular:      Rate and Rhythm: Normal rate and regular rhythm. Heart sounds: Normal heart sounds. No murmur. No friction rub. No gallop. Pulmonary:      Effort: Pulmonary effort is normal. No respiratory distress. Breath sounds: Normal breath sounds. Abdominal:      General: Bowel sounds are normal.      Palpations: Abdomen is soft. Musculoskeletal: Normal range of motion. Lumbar back: He exhibits tenderness. Lymphadenopathy:      Cervical: No cervical adenopathy. Skin:     General: Skin is warm and dry. Findings: No rash. Neurological:      Mental Status: He is alert and oriented to person, place, and time. Cranial Nerves: No cranial nerve deficit. Coordination: Coordination normal.      Deep Tendon Reflexes: Reflexes are normal and symmetric. Psychiatric:         Thought Content: Thought content normal.       BP (!) 142/85   Pulse 75   Temp 97.3 °F (36.3 °C) (Infrared)   Ht 5' 9\" (1.753 m)   Wt 185 lb (83.9 kg)   SpO2 97%   BMI 27.32 kg/m²     Assessment:       Diagnosis Orders   1. Acute right-sided low back pain with right-sided sciatica  XR LUMBAR SPINE (2-3 VIEWS)    triamcinolone acetonide (KENALOG-40) injection 40 mg    ketorolac (TORADOL) injection 30 mg    ketorolac (TORADOL) injection 30 mg    predniSONE (DELTASONE) 20 MG tablet    cyclobenzaprine (FLEXERIL) 10 MG tablet   2.  Chronic right-sided low back pain with right-sided sciatica             Plan:     1.) Toradol IM injection in office   2.) Kenalog IM injection in office   3.) Flexeril PRN QHS- caution advised with driving 4.) Start Prednisone   5.) Xray ordered- will call with results   6.) Lower back exercises provided   7.) RTO PRN     Problem List     None           Patient given educationalmaterials - see patient instructions. Discussed use, benefit, and side effectsof prescribed medications. All patient questions answered. Pt verbalized understanding. Instructed to continue current medications, diet and exercise. Patient agreedwith treatment plan. Follow up as directed.      Electronically signed by KRISTIE Duran CNP on 2/1/2021 at 2:04 PM

## 2021-02-01 NOTE — PATIENT INSTRUCTIONS
Patient Education        Low Back Pain: Exercises  Introduction  Here are some examples of exercises for you to try. The exercises may be suggested for a condition or for rehabilitation. Start each exercise slowly. Ease off the exercises if you start to have pain. You will be told when to start these exercises and which ones will work best for you. How to do the exercises  Press-up   1. Lie on your stomach, supporting your body with your forearms. 2. Press your elbows down into the floor to raise your upper back. As you do this, relax your stomach muscles and allow your back to arch without using your back muscles. As your press up, do not let your hips or pelvis come off the floor. 3. Hold for 15 to 30 seconds, then relax. 4. Repeat 2 to 4 times. Alternate arm and leg (bird dog) exercise   Do this exercise slowly. Try to keep your body straight at all times, and do not let one hip drop lower than the other. 1. Start on the floor, on your hands and knees. 2. Tighten your belly muscles. 3. Raise one leg off the floor, and hold it straight out behind you. Be careful not to let your hip drop down, because that will twist your trunk. 4. Hold for about 6 seconds, then lower your leg and switch to the other leg. 5. Repeat 8 to 12 times on each leg. 6. Over time, work up to holding for 10 to 30 seconds each time. 7. If you feel stable and secure with your leg raised, try raising the opposite arm straight out in front of you at the same time. Knee-to-chest exercise   1. Lie on your back with your knees bent and your feet flat on the floor. 2. Bring one knee to your chest, keeping the other foot flat on the floor (or keeping the other leg straight, whichever feels better on your lower back). 3. Keep your lower back pressed to the floor. Hold for at least 15 to 30 seconds. 4. Relax, and lower the knee to the starting position. 5. Repeat with the other leg. Repeat 2 to 4 times with each leg. 3. Hold the stretch for at least 15 to 30 seconds. Do not arch your back, point your toes, or bend either knee. Keep one heel touching the floor and the other heel touching the wall. 4. Repeat with your other leg. 5. Do 2 to 4 times for each leg. Hip flexor stretch   1. Kneel on the floor with one knee bent and one leg behind you. Place your forward knee over your foot. Keep your other knee touching the floor. 2. Slowly push your hips forward until you feel a stretch in the upper thigh of your rear leg. 3. Hold the stretch for at least 15 to 30 seconds. Repeat with your other leg. 4. Do 2 to 4 times on each side. Wall sit   1. Stand with your back 10 to 12 inches away from a wall. 2. Lean into the wall until your back is flat against it. 3. Slowly slide down until your knees are slightly bent, pressing your lower back into the wall. 4. Hold for about 6 seconds, then slide back up the wall. 5. Repeat 8 to 12 times. Follow-up care is a key part of your treatment and safety. Be sure to make and go to all appointments, and call your doctor if you are having problems. It's also a good idea to know your test results and keep a list of the medicines you take. Where can you learn more? Go to https://[employer].ZendyPlace. org and sign in to your [employer] account. Enter X406 in the [facility] box to learn more about \"Low Back Pain: Exercises. \"     If you do not have an account, please click on the \"Sign Up Now\" link. Current as of: March 2, 2020               Content Version: 12.6  © 9458-4410 [employer], Incorporated. Care instructions adapted under license by [facility] ([facility]). If you have questions about a medical condition or this instruction, always ask your healthcare professional. [place] [address] any warranty or liability for your use of this information.

## 2022-01-19 SDOH — ECONOMIC STABILITY: FOOD INSECURITY: WITHIN THE PAST 12 MONTHS, YOU WORRIED THAT YOUR FOOD WOULD RUN OUT BEFORE YOU GOT MONEY TO BUY MORE.: NEVER TRUE

## 2022-01-19 SDOH — ECONOMIC STABILITY: FOOD INSECURITY: WITHIN THE PAST 12 MONTHS, THE FOOD YOU BOUGHT JUST DIDN'T LAST AND YOU DIDN'T HAVE MONEY TO GET MORE.: NEVER TRUE

## 2022-01-19 ASSESSMENT — SOCIAL DETERMINANTS OF HEALTH (SDOH): HOW HARD IS IT FOR YOU TO PAY FOR THE VERY BASICS LIKE FOOD, HOUSING, MEDICAL CARE, AND HEATING?: NOT HARD AT ALL

## 2022-01-19 NOTE — PROGRESS NOTES
Visit Information    Have you changed or started any medications since your last visit including any over-the-counter medicines, vitamins, or herbal medicines? no   Have you stopped taking any of your medications? Is so, why? -  no  Are you having any side effects from any of your medications? - no    Have you seen any other physician or provider since your last visit?  no   Have you had any other diagnostic tests since your last visit? yes -    Have you been seen in the emergency room and/or had an admission in a hospital since we last saw you?  no   Have you had your routine dental cleaning in the past 6 months?  no     Do you have an active MyChart account? If no, what is the barrier?   Yes    Patient Care Team:  Gurjit Escamilla MD as Surgeon (Orthopedic Surgery)  Jake Kumar MD as Consulting Physician (Family Medicine)    Medical History Review  Past Medical, Family, and Social History reviewed and does not contribute to the patient presenting condition    Health Maintenance   Topic Date Due    Hepatitis C screen  Never done    COVID-19 Vaccine (1) Never done    HIV screen  Never done    DTaP/Tdap/Td vaccine (1 - Tdap) Never done    Diabetes screen  Never done    Lipid screen  Never done    Colon cancer screen colonoscopy  Never done    Flu vaccine (1) Never done    Depression Screen  02/01/2022    Hepatitis A vaccine  Aged Out    Hepatitis B vaccine  Aged Out    Hib vaccine  Aged Out    Meningococcal (ACWY) vaccine  Aged Out    Pneumococcal 0-64 years Vaccine  Aged Out

## 2022-01-20 ENCOUNTER — TELEMEDICINE (OUTPATIENT)
Dept: FAMILY MEDICINE CLINIC | Age: 47
End: 2022-01-20
Payer: COMMERCIAL

## 2022-01-20 DIAGNOSIS — Z12.11 SCREEN FOR COLON CANCER: ICD-10-CM

## 2022-01-20 DIAGNOSIS — R53.83 OTHER FATIGUE: Primary | ICD-10-CM

## 2022-01-20 DIAGNOSIS — Z13.1 ENCOUNTER FOR SCREENING FOR DIABETES MELLITUS: ICD-10-CM

## 2022-01-20 DIAGNOSIS — Z11.59 ENCOUNTER FOR SCREENING FOR OTHER VIRAL DISEASES: ICD-10-CM

## 2022-01-20 DIAGNOSIS — Z13.6 SCREENING FOR CARDIOVASCULAR CONDITION: ICD-10-CM

## 2022-01-20 PROBLEM — S62.318A FRACTURE OF BASE OF FIFTH METACARPAL BONE: Status: ACTIVE | Noted: 2022-01-20

## 2022-01-20 PROCEDURE — G8427 DOCREV CUR MEDS BY ELIG CLIN: HCPCS | Performed by: FAMILY MEDICINE

## 2022-01-20 PROCEDURE — 99214 OFFICE O/P EST MOD 30 MIN: CPT | Performed by: FAMILY MEDICINE

## 2022-01-20 SDOH — HEALTH STABILITY: PHYSICAL HEALTH: ON AVERAGE, HOW MANY DAYS PER WEEK DO YOU ENGAGE IN MODERATE TO STRENUOUS EXERCISE (LIKE A BRISK WALK)?: 3 DAYS

## 2022-01-20 SDOH — HEALTH STABILITY: PHYSICAL HEALTH: ON AVERAGE, HOW MANY MINUTES DO YOU ENGAGE IN EXERCISE AT THIS LEVEL?: 30 MIN

## 2022-01-20 ASSESSMENT — ENCOUNTER SYMPTOMS
NAUSEA: 0
VOMITING: 0
ABDOMINAL PAIN: 0
DIARRHEA: 0
CONSTIPATION: 0
ABDOMINAL DISTENTION: 0
WHEEZING: 0
CHEST TIGHTNESS: 0
COUGH: 0
SHORTNESS OF BREATH: 0

## 2022-01-20 NOTE — PROGRESS NOTES
2022    TELEHEALTH EVALUATION -- Audio/Visual (During TLQZY-42 public health emergency)      Ramesh Sevilla (:  1975) is a 51 y.o. male,New patient, patient was last seen by myself on 2016, was also seen at urgent care in [facility] on 2021, here to reestablish and for evaluation of the following chief complaint(s): Established New Doctor (NP, no issues), Fatigue, and Health Maintenance        ASSESSMENT/PLAN:    1. Other fatigue  Failing to change as expected. Intermittent  Will do basic labs to rule out certain common medical conditions: hematologic, renal, hepatic, electrolyte imbalances, thyroid disorders, vitamin D deficiency  His wife also requested (after the visit) to have blood type checked  -     CBC; Future  -     Comprehensive Metabolic Panel; Future  -     TSH without Reflex; Future  -     Vitamin D 25 Hydroxy; Future  -     Type And Screen; Future  2. Screen for colon cancer  -     Ambulatory referral to Gastroenterology-wants to do colonoscopy  3. Encounter for screening for diabetes mellitus  -     Hemoglobin A1C; Future  4. Screening for cardiovascular condition  -     Lipid Panel; Future  5. Encounter for screening for other viral diseases  -     Hepatitis C Antibody; Future      Dot Snuffer received counseling on the following healthy behaviors: nutrition, exercise, medication adherence and weight loss. Reviewed prior labs and health maintenance  Discussed use, benefit, and side effects of prescribed medications. Barriers to medication compliance addressed. Patient given educational materials - see patient instructions  All patient questions answered. Patient voiced understanding. The patient's past medical,surgical, social, and family history as well as his current medications and allergies were reviewed as documented in today's encounter. Medications, labs, diagnostic studies, consultations and follow-up as documented in this encounter.     Return in about 6 months (around 2022) for Face-2F-30mins PHYSICAL, VISION screen, PHQ9. Elise Given    Future Appointments   Date Time Provider Gina Shipman   2022  9:30 AM Renny Jessica MD [facility]         SUBJECTIVE/OBJECTIVE:  Melissa Ayala (:  1975) has requested an audio/video evaluation for the following concern(s):Established New Doctor (NP, no issues), Fatigue, and Health Maintenance    Reestablishing, last seen in , was seen at urgent care on 2021 FOR BACK PAIN which has resolved since then . Gadiel Owens complains of Fatigue on and off. He says he has been on call a lot, he works as a , he says he is not   sleeping too well. We discussed he could try Melatonin. Denies increased appetite, thirst or polyuria. Denies cold or heat intolerance, dry skin, constipation. Patient reports he wants referral for colonoscopy, he is worried about family history of multiple polyps in his father who regularly gets a colonoscopy. He denies constipation, abdominal pain, blood in the stool or black stools. Patient reports he just had an EKG and a full physical yesterday, and was normal.  Patient says he will send us the EKG in my chart  He denies chest pain, shortness of breath, palpitations. He denies depression, denies loss of interest in anything, he tells me he has 2 teenage daughters, and his wife is very supportive. PHQ Scores 2022   PHQ2 Score 0 0 0 0 0   PHQ9 Score 0 0 0 0 0     Due for diabetes screening. Denies increased appetite, thirst or polyuria. Due for lipids screening. Due to age, Gadiel Owens is due for lipids screening. Gadiel Owens is  eating low fat diet. he is  exercising. he is not taking any over the counter supplements. Patient is due for hepatitis C screening. Gadiel Owens 's indication is CDC recommendation. Review of Systems   Constitutional: Positive for fatigue.  Negative for activity change, appetite change, chills, diaphoresis, fever and unexpected weight change. Respiratory: Negative for cough, chest tightness, shortness of breath and wheezing. Cardiovascular: Negative for chest pain, palpitations and leg swelling. Gastrointestinal: Negative for abdominal distention, abdominal pain, constipation, diarrhea, nausea and vomiting. Endocrine: Negative for cold intolerance, heat intolerance, polydipsia, polyphagia and polyuria. Genitourinary: Negative for difficulty urinating. Musculoskeletal: Negative for arthralgias and myalgias. Skin: Negative for rash. Allergic/Immunologic: Negative for immunocompromised state. Hematological: Does not bruise/bleed easily. Psychiatric/Behavioral: Positive for sleep disturbance. Negative for dysphoric mood. The patient is not nervous/anxious. Prior to Visit Medications    Medication Sig Taking? Authorizing Provider   triamcinolone (KENALOG) 0.025 % cream Apply Topically twice a day as needed  Patient not taking: Reported on 2/1/2021  Kait Montemayor MD       Social History     Tobacco Use    Smoking status: Never Smoker    Smokeless tobacco: Never Used   Vaping Use    Vaping Use: Never used   Substance Use Topics    Alcohol use:  Yes     Alcohol/week: 2.0 standard drinks     Types: 2 Cans of beer per week     Comment: occ    Drug use: No          PHYSICAL EXAMINATION:    Vital Signs: (As obtained by patient/caregiver or practitioner observation)  -vital signs stable and within normal limits except overweight per BMI, BMI 28.1 kg/M2   Patient-Reported Vitals 1/20/2022   Patient-Reported Weight 190 lbs   Patient-Reported Height 59   Patient-Reported Systolic 982   Patient-Reported Diastolic 80   Patient-Reported Pulse 61   Patient-Reported Temperature 97.5           Intensity of pain is 0/10    Constitutional: [x] Appears well-developed and well-nourished [x] No apparent distress      [] Abnormal-     Mental status  [x] Alert and awake  [x] Oriented to person/place/time [x]Able to follow commands      Eyes:  EOM    [x]  Normal  [] Abnormal-  Sclera  [x]  Normal  [] Abnormal -         Discharge [x]  None visible  [] Abnormal -    HENT:   [x] Normocephalic, atraumatic. [] Abnormal   [x] Mouth/Throat: Mucous membranes are moist.     External Ears [x] Normal  [] Abnormal-     Neck: [x] No visualized mass     Pulmonary/Chest: [x] Respiratory effort normal.  [x] No visualized signs of difficulty breathing or respiratory distress        [] Abnormal        Musculoskeletal:   [x] Normal gait with no signs of ataxia         [x] Normal range of motion of neck        [] Abnormal-       Neurological:        [x] No Facial Asymmetry (Cranial nerve 7 motor function) (limited exam to video visit)          [x] No gaze palsy        [] Abnormal-            Skin:        [x] No significant exanthematous lesions or discoloration noted on facial skin         [] Abnormal-            Psychiatric:      [x] No Hallucinations       [x]Mood is normal      [x]Behavior is normal      [x]Judgment is normal      [x]Thought content is normal       [] Abnormal-     Other pertinent observable physical exam findings- none    Due to this being a TeleHealth encounter, evaluation of the following organ systems is limited: Vitals/Constitutional/EENT/Resp/CV/GI//MS/Neuro/Skin/Heme-Lymph-Imm.     No recent labs found in [facility] or [facility]        No results found for: WBC, HGB, HCT, MCV, PLT    No results found for: NA, K, CL, CO2, BUN, CREATININE, GLUCOSE, CALCIUM     No results found for: ALT, AST, GGT, ALKPHOS, BILITOT    Lab Results   Component Value Date    TSH 1.07 03/29/2012       No results found for: CHOL  No results found for: TRIG  No results found for: HDL  No results found for: LDLCALC, LDLCHOLESTEROL    No results found for: CHOLHDLRATIO    No results found for: LABA1C      No results found for: YETHSPHF10    No results found for: VITD25    No orders of the defined types were placed in this encounter. Orders Placed This Encounter   Procedures    Lipid Panel     Standing Status:   Future     Standing Expiration Date:   2/5/2022     Order Specific Question:   Is Patient Fasting?/# of Hours     Answer:   8 hrs    Hepatitis C Antibody     Standing Status:   Future     Standing Expiration Date:   2/5/2022    Hemoglobin A1C     Standing Status:   Future     Standing Expiration Date:   2/5/2022    CBC     Standing Status:   Future     Standing Expiration Date:   2/5/2022    Comprehensive Metabolic Panel     Standing Status:   Future     Standing Expiration Date:   2/5/2022    TSH without Reflex     Standing Status:   Future     Standing Expiration Date:   2/5/2022    Vitamin D 25 Hydroxy     Standing Status:   Future     Standing Expiration Date:   2/5/2022   Roslyn Rodriguez MD, Gastroenterology, [place]     Referral Priority:   Routine     Referral Type:   Eval and Treat     Referral Reason:   Specialty Services Required     Referred to Provider:   Marilu Hannah MD     Requested Specialty:   Gastroenterology     Number of Visits Requested:   1    Type And Screen     Standing Status:   Future     Standing Expiration Date:   1/21/2023     Scheduling Instructions:      Patient requested       Medications Discontinued During This Encounter   Medication Reason    methylPREDNISolone (MEDROL, TACOS,) 4 MG tablet Therapy completed              Foster Knee, was evaluated through a synchronous (real-time) audio-video encounter. The patient (or guardian if applicable) is aware that this is a billable service, which includes applicable co-pays. The virtual visit was conducted with patient's (and/or legal guardian consent). Verbal consent to proceed has been obtained within the past 12 months.  The visit was conducted pursuant to the emergency declaration under the [address] [facility], [facility] waiver authority and the [facility] and Response Supplemental Appropriations Act. Patient identification was verified    Patient was alone   The patient was located in a state where the provider was licensed to provide care. On this date 1/20/2022 I have spent 35 minutes reviewing previous notes, test results and face to face with the patient discussing the diagnosis and importance of compliance with the treatment plan as well as documenting on the day of the visit. --Alicja Bautista MD on 1/23/2022 at 5:32 PM    An electronic signature was used to authenticate this note.

## 2022-01-21 ENCOUNTER — TELEPHONE (OUTPATIENT)
Dept: FAMILY MEDICINE CLINIC | Age: 47
End: 2022-01-21

## 2022-01-21 NOTE — TELEPHONE ENCOUNTER
Patient wife called requesting for patient to get a lab order to check his blood type level. [facility], would just like this order to be added with the rest. Thank you!

## 2022-01-21 NOTE — TELEPHONE ENCOUNTER
I will add to his blood work, however he needs to make sure his insurance will cover, we usually we do not order blood type unless a transfusion is needed or surgery or blood donation

## 2022-01-23 VITALS — WEIGHT: 190 LBS | HEIGHT: 69 IN | BODY MASS INDEX: 28.14 KG/M2

## 2022-01-23 ASSESSMENT — PATIENT HEALTH QUESTIONNAIRE - PHQ9
SUM OF ALL RESPONSES TO PHQ QUESTIONS 1-9: 0
1. LITTLE INTEREST OR PLEASURE IN DOING THINGS: 0
SUM OF ALL RESPONSES TO PHQ9 QUESTIONS 1 & 2: 0
2. FEELING DOWN, DEPRESSED OR HOPELESS: 0
SUM OF ALL RESPONSES TO PHQ QUESTIONS 1-9: 0

## 2022-01-24 ENCOUNTER — HOSPITAL ENCOUNTER (OUTPATIENT)
Age: 47
Discharge: HOME | End: 2022-01-24
Payer: COMMERCIAL

## 2022-01-24 DIAGNOSIS — E55.9 VITAMIN D DEFICIENCY: Primary | ICD-10-CM

## 2022-01-24 DIAGNOSIS — Z11.59 ENCOUNTER FOR SCREENING FOR OTHER VIRAL DISEASES: ICD-10-CM

## 2022-01-24 DIAGNOSIS — Z13.1 ENCOUNTER FOR SCREENING FOR DIABETES MELLITUS: ICD-10-CM

## 2022-01-24 DIAGNOSIS — Z13.6 SCREENING FOR CARDIOVASCULAR CONDITION: ICD-10-CM

## 2022-01-24 DIAGNOSIS — R53.83 OTHER FATIGUE: ICD-10-CM

## 2022-01-24 LAB
ABO/RH: NORMAL
ALBUMIN SERPL-MCNC: 4.7 G/DL (ref 3.5–5.2)
ALBUMIN/GLOBULIN RATIO: ABNORMAL (ref 1–2.5)
ALP BLD-CCNC: 67 U/L (ref 40–129)
ALT SERPL-CCNC: 17 U/L (ref 5–41)
ANION GAP SERPL CALCULATED.3IONS-SCNC: 12 MMOL/L (ref 9–17)
ANTIBODY SCREEN: NEGATIVE
ARM BAND NUMBER: NORMAL
AST SERPL-CCNC: 24 U/L
BILIRUB SERPL-MCNC: 0.71 MG/DL (ref 0.3–1.2)
BLOOD BANK COMMENT: NORMAL
BUN BLDV-MCNC: 18 MG/DL (ref 6–20)
BUN/CREAT BLD: ABNORMAL (ref 9–20)
CALCIUM SERPL-MCNC: 9.4 MG/DL (ref 8.6–10.4)
CHLORIDE BLD-SCNC: 105 MMOL/L (ref 98–107)
CHOLESTEROL/HDL RATIO: 4.6
CHOLESTEROL: 196 MG/DL
CO2: 23 MMOL/L (ref 20–31)
CREAT SERPL-MCNC: 0.97 MG/DL (ref 0.7–1.2)
ESTIMATED AVERAGE GLUCOSE: 103 MG/DL
EXPIRATION DATE: NORMAL
GFR AFRICAN AMERICAN: >60 ML/MIN
GFR NON-AFRICAN AMERICAN: >60 ML/MIN
GFR SERPL CREATININE-BSD FRML MDRD: ABNORMAL ML/MIN/{1.73_M2}
GFR SERPL CREATININE-BSD FRML MDRD: ABNORMAL ML/MIN/{1.73_M2}
GLUCOSE BLD-MCNC: 101 MG/DL (ref 70–99)
HBA1C MFR BLD: 5.2 % (ref 4–6)
HCT VFR BLD CALC: 43.2 % (ref 41–53)
HDLC SERPL-MCNC: 43 MG/DL
HEMOGLOBIN: 15.2 G/DL (ref 13.5–17.5)
HEPATITIS C ANTIBODY: NONREACTIVE
LDL CHOLESTEROL: 125 MG/DL (ref 0–130)
MCH RBC QN AUTO: 30.1 PG (ref 26–34)
MCHC RBC AUTO-ENTMCNC: 35.2 G/DL (ref 31–37)
MCV RBC AUTO: 85.5 FL (ref 80–100)
NRBC AUTOMATED: NORMAL PER 100 WBC
PDW BLD-RTO: 12.9 % (ref 11.5–14.9)
PLATELET # BLD: 247 K/UL (ref 150–450)
PMV BLD AUTO: 7.7 FL (ref 6–12)
POTASSIUM SERPL-SCNC: 4 MMOL/L (ref 3.7–5.3)
RBC # BLD: 5.05 M/UL (ref 4.5–5.9)
SODIUM BLD-SCNC: 140 MMOL/L (ref 135–144)
TOTAL PROTEIN: 7.3 G/DL (ref 6.4–8.3)
TRIGL SERPL-MCNC: 138 MG/DL
TSH SERPL DL<=0.05 MIU/L-ACNC: 2.38 MIU/L (ref 0.3–5)
VITAMIN D 25-HYDROXY: 19.3 NG/ML (ref 30–100)
VLDLC SERPL CALC-MCNC: NORMAL MG/DL (ref 1–30)
WBC # BLD: 5.8 K/UL (ref 3.5–11)

## 2022-01-24 PROCEDURE — 86850 RBC ANTIBODY SCREEN: CPT

## 2022-01-24 PROCEDURE — 86803 HEPATITIS C AB TEST: CPT

## 2022-01-24 PROCEDURE — 36415 COLL VENOUS BLD VENIPUNCTURE: CPT

## 2022-01-24 PROCEDURE — 86900 BLOOD TYPING SEROLOGIC ABO: CPT

## 2022-01-24 PROCEDURE — 80061 LIPID PANEL: CPT

## 2022-01-24 PROCEDURE — 83036 HEMOGLOBIN GLYCOSYLATED A1C: CPT

## 2022-01-24 PROCEDURE — 86901 BLOOD TYPING SEROLOGIC RH(D): CPT

## 2022-01-24 PROCEDURE — 84443 ASSAY THYROID STIM HORMONE: CPT

## 2022-01-24 PROCEDURE — 80053 COMPREHEN METABOLIC PANEL: CPT

## 2022-01-24 PROCEDURE — 82306 VITAMIN D 25 HYDROXY: CPT

## 2022-01-24 PROCEDURE — 85027 COMPLETE CBC AUTOMATED: CPT

## 2022-01-24 RX ORDER — ERGOCALCIFEROL 1.25 MG/1
50000 CAPSULE ORAL WEEKLY
Qty: 12 CAPSULE | Refills: 0 | Status: SHIPPED | OUTPATIENT
Start: 2022-01-24

## 2022-01-24 NOTE — RESULT ENCOUNTER NOTE
Please notify patient: Vitamin D very low, new prescription for high-dose vitamin D weekly for 3 months sent at the pharmacy, needs to take it with food. Blood glucose mildly high but no prediabetes, low-carb diet is advised.   LDL, the bad cholesterol, mildly high, low-carb, low-fat diet advised  Otherwise labs within normal limits  continue current treatment    Future Appointments  7/8/2022   9:30 AM    [facility]

## 2022-01-26 ENCOUNTER — TELEPHONE (OUTPATIENT)
Dept: GASTROENTEROLOGY | Age: 47
End: 2022-01-26

## 2022-01-26 RX ORDER — SOD SULF/POT CHLORIDE/MAG SULF 1.479 G
TABLET ORAL
Qty: 24 TABLET | Refills: 0 | Status: SHIPPED | OUTPATIENT
Start: 2022-01-26

## 2022-01-26 NOTE — TELEPHONE ENCOUNTER
Pt called in to schedule off of his referral from his primary care physician. Pt was referred to Dr.F Tian Ferrara for a Colon screen. Pt was scheduled for Friday 4/29/22 @ [address] [place] [address] with an arrival time of 6:30A and proc time of 8:30A. Pt was advised that he will be on a CLD the day prior and that he will need a  the day of his procedure due to sedation. Pt requested Sutab/pill form of prep as his prep as he has had other family members that have had this prep and with his work schedule this is what works best for the patient. Pt is VACC and will take his vaccine card with him to his proc. Pt prep was sent to his pharmacy and his prep instructions will be sent to him via [address] and Mail. Pt verbally understands prep and prep instructions at this time and will call with any questions. Pt informed no ASA or ASA products 1 wk prior to the proc.

## 2022-02-14 LAB
BASOPHILS ABSOLUTE: 0.1 /ΜL
BASOPHILS RELATIVE PERCENT: 0.7 %
BUN BLDV-MCNC: 17 MG/DL
CALCIUM SERPL-MCNC: 9.9 MG/DL
CHLORIDE BLD-SCNC: 104 MMOL/L
CO2: 28 MMOL/L
CREAT SERPL-MCNC: 1.03 MG/DL
EOSINOPHILS ABSOLUTE: 0.1 /ΜL
EOSINOPHILS RELATIVE PERCENT: 1.2 %
GFR CALCULATED: >60
GLUCOSE BLD-MCNC: 95 MG/DL
HCT VFR BLD CALC: 44.4 % (ref 41–53)
HEMOGLOBIN: 15.5 G/DL (ref 13.5–17.5)
INR BLD: 0.91
LYMPHOCYTES ABSOLUTE: 1.9 /ΜL
LYMPHOCYTES RELATIVE PERCENT: 27.9 %
MCH RBC QN AUTO: 29.5 PG
MCHC RBC AUTO-ENTMCNC: 34.9 G/DL
MCV RBC AUTO: 84.4 FL
MONOCYTES ABSOLUTE: 0.6 /ΜL
MONOCYTES RELATIVE PERCENT: 8.2 %
NEUTROPHILS ABSOLUTE: 4.1 /ΜL
NEUTROPHILS RELATIVE PERCENT: 61.7 %
PLATELET # BLD: 288 K/ΜL
PMV BLD AUTO: NORMAL FL
POTASSIUM SERPL-SCNC: 4.5 MMOL/L
PROTIME: 12.2 SECONDS
RBC # BLD: 5.26 10^6/ΜL
SODIUM BLD-SCNC: 140 MMOL/L
WBC # BLD: 6.7 10^3/ML

## 2022-02-14 NOTE — RESULT ENCOUNTER NOTE
Please notify patient:  labs within normal limits  continue current treatment    Future Appointments  4/22/2022  11:30 AM   J[facility] MORRIS 3              JAX Caputo  7/8/2022   9:30 AM    Aris Gama MD     [facility]

## 2022-03-15 NOTE — TELEPHONE ENCOUNTER
Pt's wife, Cristiana Galvan, called wanting to switch procedure dates with pt. Pt currently scheduled 4/29/22 and is now r/s'd to [facility] Fri 3/22/22 at 915am JOHNNA Red Colon screen , VACC , [place], PAT PC 3/16/22 at 10am. Wife states she is not sure what time pt can do PAT tomorrow. She will call back once she speaks to him. Pt is tentatively scheduled for PAT at 10am on 3/16/22.

## 2022-03-16 ENCOUNTER — HOSPITAL ENCOUNTER (OUTPATIENT)
Dept: PREADMISSION TESTING | Age: 47
Discharge: HOME | End: 2022-03-20

## 2022-03-16 VITALS — WEIGHT: 185 LBS | BODY MASS INDEX: 28.04 KG/M2 | HEIGHT: 68 IN

## 2022-03-16 NOTE — PROGRESS NOTES
Pre-op Instructions For Out-Patient Endoscopy Surgery    Medication Instructions:  · Please stop herbs and any supplements now (includes vitamins and minerals). · Please contact your surgeon and prescribing physician for pre-op instructions for any blood thinners. · If you have inhalers/aerosol treatments at home, please use them the morning of your surgery and bring the inhalers with you to the hospital.    · Please take the following medications the morning of your surgery with a sip of water:  None. Surgery Instructions:  1. After midnight before surgery:  Do not eat or drink anything, including water, mints, gum, and hard candy. You may brush your teeth without swallowing. No smoking, chewing tobacco, or street drugs. 2. Please shower or bathe before surgery. 3. Please do not wear any cologne, lotion, powder, jewelry, piercings, perfume, makeup, nail polish, hair accessories, or hair spray on the day of surgery. Wear loose comfortable clothing. 4. Leave your valuables at home. Bring a storage case for any glasses/contacts. 5. An adult who is responsible for you MUST drive you home and should be with you for the first 24 hours after surgery. The Day of Surgery:  · Arrive at [address] [place] [address] Surgery Entrance at the time directed by your surgeon and check in at the desk. · If you have a living will or healthcare power of , please bring a copy. · You will be taken to the pre-op holding area where you will be prepared for surgery. A physical assessment will be performed by a nurse practitioner or house officer. Your IV will be started and you will meet your anesthesiologist.    · When you go to surgery, your family will be directed to the surgical waiting room, where the doctor should speak with them after your surgery.     · After surgery, you will be taken to the recovery room then when you are awake and stable you will go to the short stay unit for preparation to be discharged. Instructions read to Serna Kate and understanding verbalized.

## 2022-03-21 ENCOUNTER — ANESTHESIA EVENT (OUTPATIENT)
Dept: ENDOSCOPY | Age: 47
End: 2022-03-21
Payer: COMMERCIAL

## 2022-03-22 ENCOUNTER — TELEPHONE (OUTPATIENT)
Dept: GASTROENTEROLOGY | Age: 47
End: 2022-03-22

## 2022-03-22 ENCOUNTER — HOSPITAL ENCOUNTER (OUTPATIENT)
Age: 47
Setting detail: OUTPATIENT SURGERY
Discharge: HOME | End: 2022-03-22
Attending: INTERNAL MEDICINE | Admitting: INTERNAL MEDICINE
Payer: COMMERCIAL

## 2022-03-22 ENCOUNTER — ANESTHESIA (OUTPATIENT)
Dept: ENDOSCOPY | Age: 47
End: 2022-03-22
Payer: COMMERCIAL

## 2022-03-22 VITALS
HEIGHT: 68 IN | WEIGHT: 185 LBS | SYSTOLIC BLOOD PRESSURE: 122 MMHG | TEMPERATURE: 98.6 F | HEART RATE: 64 BPM | DIASTOLIC BLOOD PRESSURE: 75 MMHG | BODY MASS INDEX: 28.04 KG/M2 | RESPIRATION RATE: 14 BRPM | OXYGEN SATURATION: 100 %

## 2022-03-22 VITALS
SYSTOLIC BLOOD PRESSURE: 100 MMHG | RESPIRATION RATE: 16 BRPM | DIASTOLIC BLOOD PRESSURE: 65 MMHG | OXYGEN SATURATION: 96 %

## 2022-03-22 PROCEDURE — 2709999900 HC NON-CHARGEABLE SUPPLY: Performed by: INTERNAL MEDICINE

## 2022-03-22 PROCEDURE — 7100000011 HC PHASE II RECOVERY - ADDTL 15 MIN: Performed by: INTERNAL MEDICINE

## 2022-03-22 PROCEDURE — 7100000010 HC PHASE II RECOVERY - FIRST 15 MIN: Performed by: INTERNAL MEDICINE

## 2022-03-22 PROCEDURE — 2580000003 HC RX 258: Performed by: ANESTHESIOLOGY

## 2022-03-22 PROCEDURE — 45380 COLONOSCOPY AND BIOPSY: CPT | Performed by: INTERNAL MEDICINE

## 2022-03-22 PROCEDURE — 3609010300 HC COLONOSCOPY W/BIOPSY SINGLE/MULTIPLE: Performed by: INTERNAL MEDICINE

## 2022-03-22 PROCEDURE — 6360000002 HC RX W HCPCS: Performed by: NURSE ANESTHETIST, CERTIFIED REGISTERED

## 2022-03-22 PROCEDURE — 88305 TISSUE EXAM BY PATHOLOGIST: CPT

## 2022-03-22 PROCEDURE — 3700000000 HC ANESTHESIA ATTENDED CARE: Performed by: INTERNAL MEDICINE

## 2022-03-22 PROCEDURE — 3700000001 HC ADD 15 MINUTES (ANESTHESIA): Performed by: INTERNAL MEDICINE

## 2022-03-22 RX ORDER — SODIUM CHLORIDE 0.9 % (FLUSH) 0.9 %
5-40 SYRINGE (ML) INJECTION EVERY 12 HOURS SCHEDULED
Status: DISCONTINUED | OUTPATIENT
Start: 2022-03-22 | End: 2022-03-22 | Stop reason: HOSPADM

## 2022-03-22 RX ORDER — LIDOCAINE HYDROCHLORIDE 10 MG/ML
1 INJECTION, SOLUTION EPIDURAL; INFILTRATION; INTRACAUDAL; PERINEURAL
Status: DISCONTINUED | OUTPATIENT
Start: 2022-03-22 | End: 2022-03-22 | Stop reason: HOSPADM

## 2022-03-22 RX ORDER — SODIUM CHLORIDE 0.9 % (FLUSH) 0.9 %
5-40 SYRINGE (ML) INJECTION PRN
Status: DISCONTINUED | OUTPATIENT
Start: 2022-03-22 | End: 2022-03-22 | Stop reason: HOSPADM

## 2022-03-22 RX ORDER — PROPOFOL 10 MG/ML
INJECTION, EMULSION INTRAVENOUS CONTINUOUS PRN
Status: DISCONTINUED | OUTPATIENT
Start: 2022-03-22 | End: 2022-03-22 | Stop reason: SDUPTHER

## 2022-03-22 RX ORDER — SODIUM CHLORIDE 9 MG/ML
25 INJECTION, SOLUTION INTRAVENOUS PRN
Status: DISCONTINUED | OUTPATIENT
Start: 2022-03-22 | End: 2022-03-22 | Stop reason: HOSPADM

## 2022-03-22 RX ORDER — METOCLOPRAMIDE HYDROCHLORIDE 5 MG/ML
10 INJECTION INTRAMUSCULAR; INTRAVENOUS
Status: DISCONTINUED | OUTPATIENT
Start: 2022-03-22 | End: 2022-03-22 | Stop reason: HOSPADM

## 2022-03-22 RX ORDER — MEPERIDINE HYDROCHLORIDE 25 MG/ML
12.5 INJECTION INTRAMUSCULAR; INTRAVENOUS; SUBCUTANEOUS EVERY 5 MIN PRN
Status: DISCONTINUED | OUTPATIENT
Start: 2022-03-22 | End: 2022-03-22 | Stop reason: HOSPADM

## 2022-03-22 RX ORDER — DIPHENHYDRAMINE HYDROCHLORIDE 50 MG/ML
12.5 INJECTION INTRAMUSCULAR; INTRAVENOUS
Status: DISCONTINUED | OUTPATIENT
Start: 2022-03-22 | End: 2022-03-22 | Stop reason: HOSPADM

## 2022-03-22 RX ORDER — ONDANSETRON 2 MG/ML
4 INJECTION INTRAMUSCULAR; INTRAVENOUS
Status: DISCONTINUED | OUTPATIENT
Start: 2022-03-22 | End: 2022-03-22 | Stop reason: HOSPADM

## 2022-03-22 RX ORDER — FENTANYL CITRATE 50 UG/ML
25 INJECTION, SOLUTION INTRAMUSCULAR; INTRAVENOUS EVERY 5 MIN PRN
Status: DISCONTINUED | OUTPATIENT
Start: 2022-03-22 | End: 2022-03-22 | Stop reason: HOSPADM

## 2022-03-22 RX ORDER — SODIUM CHLORIDE, SODIUM LACTATE, POTASSIUM CHLORIDE, CALCIUM CHLORIDE 600; 310; 30; 20 MG/100ML; MG/100ML; MG/100ML; MG/100ML
INJECTION, SOLUTION INTRAVENOUS CONTINUOUS
Status: DISCONTINUED | OUTPATIENT
Start: 2022-03-22 | End: 2022-03-22 | Stop reason: HOSPADM

## 2022-03-22 RX ADMIN — PROPOFOL 150 MCG/KG/MIN: 10 INJECTION, EMULSION INTRAVENOUS at 09:24

## 2022-03-22 RX ADMIN — SODIUM CHLORIDE, POTASSIUM CHLORIDE, SODIUM LACTATE AND CALCIUM CHLORIDE: 600; 310; 30; 20 INJECTION, SOLUTION INTRAVENOUS at 08:16

## 2022-03-22 ASSESSMENT — PULMONARY FUNCTION TESTS
PIF_VALUE: 0

## 2022-03-22 ASSESSMENT — PAIN - FUNCTIONAL ASSESSMENT: PAIN_FUNCTIONAL_ASSESSMENT: 0-10

## 2022-03-22 ASSESSMENT — ENCOUNTER SYMPTOMS
BACK PAIN: 0
TROUBLE SWALLOWING: 0
COUGH: 0
SINUS PAIN: 0
SHORTNESS OF BREATH: 0
CHEST TIGHTNESS: 0
SORE THROAT: 0
WHEEZING: 0
RHINORRHEA: 0
STRIDOR: 0
SINUS PRESSURE: 0
APNEA: 0

## 2022-03-22 NOTE — H&P
HISTORY and [id number]       NAME:  Josiah Diamnod  MRN: [id number]   YOB: 1975   Date: 3/22/2022   Age: 51 y.o. Gender: male       COMPLAINT AND PRESENT HISTORY:   Josiah Diamond  is a 51 y.o. male presenting today for a colorectal cancer screening. This is pt's first colonoscopy, denies any family hx of colon cancer father does have hx of colon polyps. Pt denies any gi symptoms including n/v/d/c, no abdominal pain, no bloody or tarry stools. Pt states about 10 years ago he had a great deal of GERD d/t diet, he had an EGD at that time states he took some meds for a short while and has had no issues since. Denies any dysphagia, voice change or sore throat. No significant PMHX. Did have recent toe surgery, was on abx, completed course, denies any signs of infection. Pt reports completing bowel prep without complications, last BM reported this morning. He states last BM was clear with no stool particles. NPO since midnight. Pt does not wear dentures. Pt denies any hx of MRSA infection  Pt not currently taking any blood thinners or anticoagulants  Pt reports hx of waking during MAC anesthesia, no issues with general anesthesia   Pt denies any acute symptoms of illness at this time including no SOB, CP, fever, URI or UTI symptoms. RECENT IMAGING    No results found. PAST MEDICAL HISTORY     Past Medical History:   Diagnosis Date    Anesthesia     heard talking in procedure at one time with an endoscopy,hx.  of  hard time waking up  x1    Foot fracture, right 05/2016    right great toe fracture    Left hand fracture 05/2016    5th MCP       SURGICAL HISTORY       Past Surgical History:   Procedure Laterality Date    FRACTURE SURGERY  2016    Hand surgery    HAND SURGERY Left 05/18/2016    SINUS SURGERY  2012    nasal septum repair, nasal polyps    TOE SURGERY      VASECTOMY  2011       FAMILY HISTORY       Family History   Problem Relation Age of Onset    Other Mother         Myastenia Gravis, WPW, autoimmune disorder      Other Father         adopted    Colon Polyps Father     Other Brother         Past away       SOCIAL HISTORY       Social History     Socioeconomic History    Marital status:      Spouse name: Not on file    Number of children: Not on file    Years of education: Not on file    Highest education level: Not on file   Occupational History    Not on file   Tobacco Use    Smoking status: Never Smoker    Smokeless tobacco: Never Used   Vaping Use    Vaping Use: Never used   Substance and Sexual Activity    Alcohol use: Yes     Alcohol/week: 2.0 standard drinks     Types: 2 Cans of beer per week     Comment: occ    Drug use: No    Sexual activity: Yes     Partners: Female   Other Topics Concern    Not on file   Social History Narrative    Works as a      Social Determinants of Health     Financial Resource Strain: Low Risk     Difficulty of Paying Living Expenses: Not hard at all   Food Insecurity: No Food Insecurity    Worried About [employer] [employer] in the Last Year: Never true    [facility] in the Last Year: Never true   Transportation Needs:     Lack of Transportation (Medical): Not on file    Lack of Transportation (Non-Medical):  Not on file   Physical Activity: Insufficiently Active    Days of Exercise per Week: 3 days    Minutes of Exercise per Session: 30 min   Stress:     Feeling of Stress : Not on file   Social Connections:     Frequency of Communication with Friends and Family: Not on file    Frequency of Social Gatherings with Friends and Family: Not on file    Attends [employer] Services: Not on file    Active Member of Clubs or Organizations: Not on file    Attends Club or Organization Meetings: Not on file    Marital Status: Not on file   Intimate Partner Violence: Not At Risk    Fear of Current or Ex-Partner: No    Emotionally Abused: No    Physically Abused: No    Sexually Abused: No   Housing Stability:     Unable to Pay for Housing in the Last Year: Not on file    Number of Places Lived in the Last Year: Not on file    Unstable Housing in the Last Year: Not on file           REVIEW OF SYSTEMS      No Known Allergies    No current facility-administered medications on file prior to encounter. Current Outpatient Medications on File Prior to Encounter   Medication Sig Dispense Refill    Sodium Sulfate-Mag Sulfate-KCl (SUTAB) 1303-967-194 MG TABS Please follow instructions given to you by your provider 24 tablet 0    vitamin D (ERGOCALCIFEROL) 1.25 MG (71791 UT) CAPS capsule Take 1 capsule by mouth once a week (Patient taking differently: Take 50,000 Units by mouth once a week Wednesday.) 12 capsule 0        Review of Systems   Constitutional: Negative for chills, diaphoresis, fatigue and fever. HENT: Negative for congestion, dental problem, ear pain, postnasal drip, rhinorrhea, sinus pressure, sinus pain, sore throat and trouble swallowing. Eyes: Positive for visual disturbance. Respiratory: Negative for apnea, cough, chest tightness, shortness of breath and wheezing. Cardiovascular: Negative for chest pain, palpitations and leg swelling. Gastrointestinal:        SEE HPI    Genitourinary: Negative for dysuria, flank pain, frequency and hematuria. Musculoskeletal: Negative for back pain, joint swelling and myalgias. Skin: Positive for wound. Negative for rash. Healing surgical incision to left great toe    Neurological: Negative for dizziness, weakness, numbness and headaches. Hematological: Does not bruise/bleed easily. Psychiatric/Behavioral: Negative for agitation and confusion. The patient is not nervous/anxious. See HPI    GENERAL PHYSICAL EXAM:     Vitals: See nurse flowsheet for current vitals. Physical Exam  Constitutional:       General: He is not in acute distress. Appearance: Normal appearance.  He is well-developed and normal weight. He is not ill-appearing or toxic-appearing. HENT:      Head: Normocephalic and atraumatic. Mouth/Throat:      Mouth: Mucous membranes are dry. Pharynx: Oropharynx is clear. No oropharyngeal exudate or posterior oropharyngeal erythema. Eyes:      Extraocular Movements: Extraocular movements intact. Conjunctiva/sclera: Conjunctivae normal.      Pupils: Pupils are equal, round, and reactive to light. Comments: +glasses    Cardiovascular:      Rate and Rhythm: Normal rate and regular rhythm. Pulses: Normal pulses. Heart sounds: Normal heart sounds. No murmur heard. No friction rub. No gallop. Pulmonary:      Effort: Pulmonary effort is normal.      Breath sounds: Normal breath sounds. No wheezing. Abdominal:      General: Bowel sounds are normal. There is no distension. Palpations: Abdomen is soft. Tenderness: There is no abdominal tenderness. There is no guarding or rebound. Comments: Hyperactive bs. Musculoskeletal:         General: No swelling. Normal range of motion. Cervical back: Normal range of motion and neck supple. No rigidity or tenderness. Right lower leg: No edema. Left lower leg: No edema. Skin:     General: Skin is warm and dry. Findings: No erythema. Neurological:      General: No focal deficit present. Mental Status: He is alert and oriented to person, place, and time. Mental status is at baseline. Sensory: No sensory deficit. Psychiatric:         Mood and Affect: Mood normal.         Behavior: Behavior normal.         Thought Content: Thought content normal.         Judgment: Judgment normal.                                                                                         PROVISIONAL DIAGNOSES / SURGERY:      Colorectal cancer screening.    Colon screening  Patient Active Problem List    Diagnosis Date Noted    Vitamin D deficiency 01/24/2022    Fracture of base of fifth metacarpal bone 01/20/2022               KRISTIE Jolley - CNP on 3/22/2022 at 7:18 AM

## 2022-03-22 NOTE — OP NOTE
PROCEDURE NOTE    DATE OF PROCEDURE: 3/22/2022    SURGEON: Micheal Rojo MD    ASSISTANT: None    PREOPERATIVE DIAGNOSIS: SCREENING    POSTOPERATIVE DIAGNOSIS: as described below    OPERATION: Total colonoscopy   COLD BIOPSIES  ANESTHESIA: MAC PER ANESTHESIA     ESTIMATED BLOOD LOSS: less than 50     COMPLICATIONS: None. SPECIMENS:  Was Obtained:     HISTORY: The patient is a 51y.o. year old male with history of above preop diagnosis. I recommended colonoscopy with possible biopsy or polypectomy and I explained the risk, benefits, expected outcome, and alternatives to the procedure. Risks included but are not limited to bleeding, infection, respiratory distress, hypotension, and perforation of the colon and possibility of missing a lesion. The patient understands and is in agreement. PROCEDURE: The patient was given IV conscious sedation. The patient's SPO2 remained above 90% throughout the procedure. The colonoscope was inserted per rectum and advanced under direct vision to the cecum without difficulty. The prep was good. GREENISH LIQUID STOOLS    Findings:  Terminal ileum: normal    Cecum/Ascending colon: normal    Transverse colon: normal    Descending/Sigmoid colon: abnormal: A DIMINUTIVE POLYP WAS EXCISED WITH JUMBO BIOPSY FORCEPS FROM THE SIGMOID AT 45 CM    A LIPOMA WAS BIOPSIED AT 35 CM    Rectum/Anus: examined in normal and retroflexed positions and was abnormal: SMALL TO MOD INT HEMORRHOIDS    Withdrawal Time was (minutes): 14    The colon was decompressed and the scope was removed. The patient tolerated the procedure well. Recommendations/Plan:   1. Lifestyle and dietary modifications as discussed  2. F/U Biopsies  3. F/U In Office in 3-4 weeks  4. Discussed with the family  5. Colonoscopy Recall :5 year  6. POST SEDATION PATIENT WAS STABLE WITH STABLE VITAL SIGNS AND OXYGEN SATURATIONS AND WAS DISCHARGED HOME WITH RIDE IN A STABLE CONDITION.     Electronically signed by Johanna Koroma Ivania Panchal MD  on 3/22/2022 at 9:56 AM

## 2022-03-22 NOTE — ANESTHESIA PRE PROCEDURE
Department of Anesthesiology  Preprocedure Note       Name:  Ornelas    Age:  51 y.o.  :  1975                                          MRN:  [id number]         Date:  3/22/2022      Surgeon: Austin Tracy):  Maddi Sapp MD    Procedure: Procedure(s):  COLORECTAL CANCER SCREENING, NOT HIGH RISK    Medications prior to admission:   Prior to Admission medications    Medication Sig Start Date End Date Taking? Authorizing Provider   Sodium Sulfate-Mag Sulfate-KCl (SUTAB) 0109-268-170 MG TABS Please follow instructions given to you by your provider 22   Maddi Sapp MD   vitamin D (ERGOCALCIFEROL) 1.25 MG (17463 UT) CAPS capsule Take 1 capsule by mouth once a week  Patient taking differently: Take 50,000 Units by mouth once a week Wednesday.  22   Christo Osborne MD       Current medications:    Current Facility-Administered Medications   Medication Dose Route Frequency Provider Last Rate Last Admin    lidocaine PF 1 % injection 1 mL  1 mL IntraDERmal Once PRN Sheila Keenan MD        lactated ringers infusion   IntraVENous Continuous Sheila Keenan MD        sodium chloride flush 0.9 % injection 5-40 mL  5-40 mL IntraVENous 2 times per day Sheila Keenan MD        sodium chloride flush 0.9 % injection 5-40 mL  5-40 mL IntraVENous PRN Sheila Keenan MD        0.9 % sodium chloride infusion  25 mL IntraVENous PRN Sheila Keenan MD           Allergies:  No Known Allergies    Problem List:    Patient Active Problem List   Diagnosis Code    Fracture of base of fifth metacarpal bone S62.318A    Vitamin D deficiency E55.9       Past Medical History:        Diagnosis Date    Anesthesia     having conversations with MAC    Foot fracture, right 2016    right great toe fracture    Left hand fracture 2016    [facility]       Past Surgical History:        Procedure Laterality Date    FRACTURE SURGERY  2016    Hand surgery    HAND SURGERY Left 2016   [employer] SINUS SURGERY   nasal septum repair, nasal polyps    TOE SURGERY      UPPER GASTROINTESTINAL ENDOSCOPY  2011    VASECTOMY  2011       Social History:    Social History     Tobacco Use    Smoking status: Never Smoker    Smokeless tobacco: Never Used   Substance Use Topics    Alcohol use: Yes     Alcohol/week: 2.0 standard drinks     Types: 2 Cans of beer per week     Comment: occ                                Counseling given: Not Answered      Vital Signs (Current):   Vitals:    03/22/22 0751   BP: 135/87   Pulse: 75   Resp: 16   Temp: 97.3 °F (36.3 °C)   TempSrc: Infrared   SpO2: 98%   Weight: 185 lb (83.9 kg)   Height: 5' 8\" (1.727 m)                                              BP Readings from Last 3 Encounters:   03/22/22 135/87   02/01/21 (!) 142/85   02/06/20 133/86       NPO Status: Time of last liquid consumption: 2200                        Time of last solid consumption: 2100                        Date of last liquid consumption: 03/21/22                        Date of last solid food consumption: 03/20/22    BMI:   Wt Readings from Last 3 Encounters:   03/22/22 185 lb (83.9 kg)   03/16/22 185 lb (83.9 kg)   01/23/22 190 lb (86.2 kg)     Body mass index is 28.13 kg/m². CBC:   Lab Results   Component Value Date    WBC 6.70 02/14/2022    RBC 5.26 02/14/2022    HGB 15.5 02/14/2022    HCT 44.4 02/14/2022    MCV 84.4 02/14/2022    RDW 12.9 01/24/2022     02/14/2022       CMP:   Lab Results   Component Value Date     02/14/2022    K 4.5 02/14/2022     02/14/2022    CO2 28 02/14/2022    BUN 17 02/14/2022    CREATININE 1.03 02/14/2022    GFRAA >60 01/24/2022    LABGLOM >60 02/14/2022    LABGLOM >60 01/24/2022    GLUCOSE 95 02/14/2022    PROT 7.3 01/24/2022    CALCIUM 9.9 02/14/2022    BILITOT 0.71 01/24/2022    ALKPHOS 67 01/24/2022    AST 24 01/24/2022    ALT 17 01/24/2022       POC Tests: No results for input(s): POCGLU, POCNA, POCK, POCCL, POCBUN, POCHEMO, POCHCT in the last 72 hours.     Coags: Lab Results   Component Value Date    PROTIME 12.2 02/14/2022    INR 0.91 02/14/2022       HCG (If Applicable): No results found for: PREGTESTUR, PREGSERUM, HCG, HCGQUANT     ABGs: No results found for: PHART, PO2ART, OMJ4IWQ, SRF3QEC, BEART, L1CTUPFE     Type & Screen (If Applicable):  No results found for: LABABO, LABRH    Drug/Infectious Status (If Applicable):  Lab Results   Component Value Date    HEPCAB NONREACTIVE 01/24/2022       COVID-19 Screening (If Applicable): No results found for: COVID19        Anesthesia Evaluation  Patient summary reviewed and Nursing notes reviewed history of anesthetic complications:   Airway: Mallampati: II  TM distance: >3 FB   Neck ROM: full  Mouth opening: > = 3 FB Dental: normal exam         Pulmonary:Negative Pulmonary ROS breath sounds clear to auscultation      (-) rhonchi, wheezes, rales, stridor and no decreased breath sounds                           Cardiovascular:Negative CV ROS        (-) murmur, weak pulses,  friction rub, systolic click, carotid bruit,  JVD and peripheral edema    ECG reviewed  Rhythm: regular  Rate: normal                    Neuro/Psych:   Negative Neuro/Psych ROS              GI/Hepatic/Renal:   (+) bowel prep,           Endo/Other: Negative Endo/Other ROS                    Abdominal:             Vascular: negative vascular ROS. Other Findings:           Anesthesia Plan      general     ASA 1       Induction: intravenous. Anesthetic plan and risks discussed with patient. Plan discussed with CRNA.                   Мария Velazquez MD   3/22/2022

## 2022-03-22 NOTE — ANESTHESIA POSTPROCEDURE EVALUATION
POST- ANESTHESIA EVALUATION       Pt Name: Violetta Alberto  MRN: [id number]  [place]: 1975  Date of evaluation: 3/22/2022  Time:  10:38 AM      /75   Pulse 64   Temp 98.6 °F (37 °C) (Infrared)   Resp 14   Ht 5' 8\" (1.727 m)   Wt 185 lb (83.9 kg)   SpO2 100%   BMI 28.13 kg/m²      Consciousness Level  Awake  Cardiopulmonary Status  Stable  Pain Adequately Treated YES  Nausea / Vomiting  NO  Adequate Hydration  YES  Anesthesia Related Complications NONE      Electronically signed by Brianna Pfeiffer MD on 3/22/2022 at 10:38 AM       Department of Anesthesiology  Postprocedure Note    Patient: Violetta Alberto  MRN: [id number]  YOB: 1975  Date of evaluation: 3/22/2022  Time:  10:38 AM     Procedure Summary     Date: 03/22/22 Room / Location: [facility] 04 / [facility]    Anesthesia Start: 0912 Anesthesia Stop: 1000    Procedure: COLONOSCOPY POLYPECTOMY SNARE/COLD BIOPSY (N/A ) Diagnosis:       (COLON SCREENING)      (PT FULLY VACCINATED)    Surgeons: Sil Zelaya MD Responsible Provider: Brianna Pfeiffer MD    Anesthesia Type: general ASA Status: 1          Anesthesia Type: general    Li Phase I:      Li Phase II: Li Score: 9    Last vitals: Reviewed and per EMR flowsheets.        Anesthesia Post Evaluation

## 2022-03-23 LAB — SURGICAL PATHOLOGY REPORT: NORMAL

## 2022-03-23 NOTE — RESULT ENCOUNTER NOTE
Mychart comment sent to patient.   Benign polyp, not cancerous  Future Appointments  7/8/2022   9:30 AM    MD lazaro Bruno               [facility]

## 2022-05-23 ENCOUNTER — OFFICE VISIT (OUTPATIENT)
Dept: GASTROENTEROLOGY | Age: 47
End: 2022-05-23
Payer: COMMERCIAL

## 2022-05-23 VITALS
BODY MASS INDEX: 30.26 KG/M2 | SYSTOLIC BLOOD PRESSURE: 118 MMHG | WEIGHT: 199 LBS | DIASTOLIC BLOOD PRESSURE: 76 MMHG | TEMPERATURE: 97 F

## 2022-05-23 DIAGNOSIS — D17.5 LIPOMA OF COLON: ICD-10-CM

## 2022-05-23 DIAGNOSIS — Z98.890 STATUS POST COLONOSCOPY: Primary | ICD-10-CM

## 2022-05-23 DIAGNOSIS — K63.5 HYPERPLASTIC POLYP OF SIGMOID COLON: ICD-10-CM

## 2022-05-23 PROCEDURE — G8417 CALC BMI ABV UP PARAM F/U: HCPCS | Performed by: INTERNAL MEDICINE

## 2022-05-23 PROCEDURE — G8427 DOCREV CUR MEDS BY ELIG CLIN: HCPCS | Performed by: INTERNAL MEDICINE

## 2022-05-23 PROCEDURE — 99213 OFFICE O/P EST LOW 20 MIN: CPT | Performed by: INTERNAL MEDICINE

## 2022-05-23 PROCEDURE — 1036F TOBACCO NON-USER: CPT | Performed by: INTERNAL MEDICINE

## 2022-05-23 ASSESSMENT — ENCOUNTER SYMPTOMS
ANAL BLEEDING: 0
CHOKING: 0
COLOR CHANGE: 0
CONSTIPATION: 0
ABDOMINAL DISTENTION: 0
VOMITING: 0
RECTAL PAIN: 0
BLOOD IN STOOL: 0
WHEEZING: 0
DIARRHEA: 0
TROUBLE SWALLOWING: 0
ABDOMINAL PAIN: 0
SORE THROAT: 0
SHORTNESS OF BREATH: 0
COUGH: 0
NAUSEA: 0

## 2022-05-23 NOTE — PROGRESS NOTES
GI CLINIC FOLLOW UP    NTERVAL HISTORY:   No referring provider defined for this encounter. Chief Complaint   Patient presents with    Colonoscopy     Pt is here today for a f/u on colonoscopy proc done on 03/22/22. 1. Status post colonoscopy    2. Hyperplastic polyp of sigmoid colon    3. Lipoma of colon       The patient is here as a follow up of his recent GI procedure. The results have been sent to you separately   The findings were explained to the patient in detail and biopsies were also discussed   with him    Pt is clinically feeling well GI wise  Has No significant abdominal pains, bloating or cramping  Has no sig GERD symptoms  Has no Dysphagia, No Nausea or any vomiting  Denies any rectal bleeding or any melena  Denies any sig constipation or any diarrhea symptoms  Weight is stable and appetite is generally good. No known family history for colon cancer    Has family history for colon polyps      Smoking alcohol abuse illicit drug usage    HISTORY OF PRESENT ILLNESS: Reyna Mosqueda is a 49 y.o. male with a past history remarkable for , referred for evaluation of   Chief Complaint   Patient presents with    Colonoscopy     Pt is here today for a f/u on colonoscopy proc done on 03/22/22. Hermelindo Pittman Past Medical,Family, and Social History reviewed and does contribute to the patient presenting condition. Patient's PMH/PSH,SH,PSYCH Hx, MEDs, ALLERGIES, and ROS were all reviewed and updated in the appropriate sections.     PAST MEDICAL HISTORY:  Past Medical History:   Diagnosis Date    Anesthesia     having conversations with MAC    Foot fracture, right 05/2016    right great toe fracture    Left hand fracture 05/2016    [facility]       Past Surgical History:   Procedure Laterality Date    COLONOSCOPY N/A 3/22/2022    COLONOSCOPY POLYPECTOMY performed by Allyn Moise MD at [address] [place] [address]  2016    Hand surgery    HAND SURGERY Left 05/18/2016    SINUS SURGERY 2012    nasal septum repair, nasal polyps    TOE SURGERY      UPPER GASTROINTESTINAL ENDOSCOPY  2011    VASECTOMY  2011       CURRENT MEDICATIONS:    Current Outpatient Medications:     Sodium Sulfate-Mag Sulfate-KCl (SUTAB) 1334-955-531 MG TABS, Please follow instructions given to you by your provider, Disp: 24 tablet, Rfl: 0    vitamin D (ERGOCALCIFEROL) 1.25 MG (12354 UT) CAPS capsule, Take 1 capsule by mouth once a week (Patient taking differently: Take 50,000 Units by mouth once a week Wednesday.), Disp: 12 capsule, Rfl: 0    ALLERGIES:   No Known Allergies    FAMILY HISTORY:       Problem Relation Age of Onset    Other Mother         Myastenia Gravis, WPW, autoimmune disorder      Other Father         adopted   [employer] Colon Polyps Father     Other Brother         Past away         SOCIAL HISTORY:   Social History     Socioeconomic History    Marital status:      Spouse name: Not on file    Number of children: Not on file    Years of education: Not on file    Highest education level: Not on file   Occupational History    Not on file   Tobacco Use    Smoking status: Never Smoker    Smokeless tobacco: Never Used   Vaping Use    Vaping Use: Never used   Substance and Sexual Activity    Alcohol use: Yes     Alcohol/week: 2.0 standard drinks     Types: 2 Cans of beer per week     Comment: occ    Drug use: No    Sexual activity: Yes     Partners: Female   Other Topics Concern    Not on file   Social History Narrative    Works as a      Social Determinants of Health     Financial Resource Strain: Low Risk     Difficulty of Paying Living Expenses: Not hard at all   Food Insecurity: No Food Insecurity    Worried About [address] in the Last Year: Never true    [address] in the Last Year: Never true   Transportation Needs:     Lack of Transportation (Medical): Not on file    Lack of Transportation (Non-Medical):  Not on file   Physical Activity: Insufficiently Active    Days of Exercise per Week: 3 days    Minutes of Exercise per Session: 30 min   Stress:     Feeling of Stress : Not on file   Social Connections:     Frequency of Communication with Friends and Family: Not on file    Frequency of Social Gatherings with Friends and Family: Not on file    Attends Taoism Services: Not on file    Active Member of Clubs or Organizations: Not on file    Attends Club or Organization Meetings: Not on file    Marital Status: Not on file   Intimate Partner Violence: Not At Risk    Fear of Current or Ex-Partner: No    Emotionally Abused: No    Physically Abused: No    Sexually Abused: No   Housing Stability:     Unable to Pay for Housing in the Last Year: Not on file    Number of [place] in the Last Year: Not on file    Unstable Housing in the Last Year: Not on file         REVIEW OF SYSTEMS:         Review of Systems   Constitutional: Negative for appetite change and fatigue. HENT: Negative for sore throat and trouble swallowing. Eyes: Negative for visual disturbance. Respiratory: Negative for cough, choking, shortness of breath and wheezing. Cardiovascular: Negative for chest pain, palpitations and leg swelling. Gastrointestinal: Negative for abdominal distention, abdominal pain, anal bleeding, blood in stool, constipation, diarrhea, nausea, rectal pain and vomiting. Skin: Negative for color change. Allergic/Immunologic: Negative for environmental allergies and food allergies. Neurological: Negative for dizziness, weakness, numbness and headaches. Hematological: Does not bruise/bleed easily. Psychiatric/Behavioral: Negative for confusion and sleep disturbance. The patient is not nervous/anxious. PHYSICAL EXAMINATION: Vital signs reviewed per the nursing documentation. /76   Temp 97 °F (36.1 °C)   Wt 199 lb (90.3 kg)   BMI 30.26 kg/m²   Body mass index is 30.26 kg/m². Physical Exam  Nursing note reviewed.    Constitutional: Appearance: He is well-developed. Comments: Anxious    HENT:      Head: Normocephalic and atraumatic. Eyes:      Conjunctiva/sclera: Conjunctivae normal.      Pupils: Pupils are equal, round, and reactive to light. Cardiovascular:      Rate and Rhythm: Normal rate and regular rhythm. Pulmonary:      Effort: Pulmonary effort is normal.      Breath sounds: Normal breath sounds. Abdominal:      General: Bowel sounds are normal.      Palpations: Abdomen is soft. Comments: NON TENDER, NON DISTENTED  LIVER SPLEEN AND HERNIAS ARE NOT  PALPABLE  BOWEL SOUNDS ARE POSITIVE      Genitourinary:     Rectum: Normal.   Musculoskeletal:         General: Normal range of motion. Cervical back: Normal range of motion and neck supple. Skin:     General: Skin is warm. Neurological:      Mental Status: He is alert and oriented to person, place, and time. Deep Tendon Reflexes: Reflexes are normal and symmetric. LABORATORY DATA: Reviewed  Lab Results   Component Value Date    WBC 6.70 02/14/2022    HGB 15.5 02/14/2022    HCT 44.4 02/14/2022    MCV 84.4 02/14/2022     02/14/2022     02/14/2022    K 4.5 02/14/2022     02/14/2022    CO2 28 02/14/2022    BUN 17 02/14/2022    CREATININE 1.03 02/14/2022    LABALBU 4.7 01/24/2022    BILITOT 0.71 01/24/2022    ALKPHOS 67 01/24/2022    AST 24 01/24/2022    ALT 17 01/24/2022    INR 0.91 02/14/2022         Lab Results   Component Value Date    RBC 5.26 02/14/2022    HGB 15.5 02/14/2022    MCV 84.4 02/14/2022    MCH 29.5 02/14/2022    MCHC 34.9 02/14/2022    RDW 12.9 01/24/2022    MPV 7.7 01/24/2022    BASOPCT 0.7 02/14/2022    LYMPHSABS 1.9 02/14/2022    MONOSABS 0.6 02/14/2022    NEUTROABS 4.1 02/14/2022    EOSABS 0.1 02/14/2022    BASOSABS 0.1 02/14/2022         DIAGNOSTIC TESTING:     No results found. Assessment  1. Status post colonoscopy    2. Hyperplastic polyp of sigmoid colon    3.  Lipoma of colon        Plan    Repeat colonoscopy 7 8 years  Call for any problem or issues    Pt was advised in detail about some life style and dietary modifications. He was advised about avoidance of caffeine, nicotine and chocolate. Pt was also told to stay away from any kind of fast foods, soda pops. He was also advised to avoid lots of spices, grease and fried food etc.     Instructions were also given about trying to arrange the timing, quality and quantity of food. Instructions were given about using ample amount of fiber including dietary and supplemental fiber either metamucil, bennafiber or citrucell etc.  Pt was advised about drinking ample amount of water without any colors or chemicals. Stress was given about regular exercise. Pt has verbalized understanding and agreement to these modifications. The patient was instructed to start taking some OTC Probiotics products   These are available over the counter at the Pharmacy stores and Grocery stores  He was explained about the beneficial effects they have in the GI track  They will help to establish the good bacterial jose francisco and will help with the digestion and bowel movements  The patient has verbalized understanding and agreement to this plan    More than half of patient's clinic visit time was spent in counseling about lifestyle and dietary modifications  Patient's  questions were answered in this regard as well  The patient has verbalized understanding and agreement         Thank you for allowing me to participate in the care of Mr. Sidney Rosas. For any further questions please do not hesitate to contact me. I have reviewed and agree with the ROS entered by the MA/Nurse.          Sara Harris MD, [facility]  Board Certified in Gastroenterology and [address] [place] [address] Gastroenterology  Office #: [phone]

## 2023-04-21 ENCOUNTER — HOSPITAL ENCOUNTER (EMERGENCY)
Age: 48
Discharge: HOME | End: 2023-04-21
Attending: EMERGENCY MEDICINE
Payer: COMMERCIAL

## 2023-04-21 VITALS
SYSTOLIC BLOOD PRESSURE: 150 MMHG | TEMPERATURE: 98.3 F | HEIGHT: 69 IN | WEIGHT: 195 LBS | BODY MASS INDEX: 28.88 KG/M2 | DIASTOLIC BLOOD PRESSURE: 98 MMHG | OXYGEN SATURATION: 100 % | RESPIRATION RATE: 16 BRPM | HEART RATE: 68 BPM

## 2023-04-21 DIAGNOSIS — H57.12 LEFT EYE PAIN: Primary | ICD-10-CM

## 2023-04-21 PROCEDURE — 6370000000 HC RX 637 (ALT 250 FOR IP): Performed by: EMERGENCY MEDICINE

## 2023-04-21 PROCEDURE — 99283 EMERGENCY DEPT VISIT LOW MDM: CPT

## 2023-04-21 RX ORDER — TETRACAINE HYDROCHLORIDE 5 MG/ML
2 SOLUTION OPHTHALMIC ONCE
Status: COMPLETED | OUTPATIENT
Start: 2023-04-21 | End: 2023-04-21

## 2023-04-21 RX ADMIN — TETRACAINE HYDROCHLORIDE 2 DROP: 5 SOLUTION OPHTHALMIC at 10:09

## 2023-04-21 RX ADMIN — FLUORESCEIN SODIUM 1 EACH: 0.6 STRIP OPHTHALMIC at 10:09

## 2023-04-21 ASSESSMENT — TONOMETRY
IOP_HANDHELD: 1
OS_IOP_MMHG: 32
OD_IOP_MMHG: 17

## 2023-04-21 ASSESSMENT — VISUAL ACUITY
OD: 20/20
OU: 20/20
OS: 20/20
OU: 1

## 2023-04-21 ASSESSMENT — ENCOUNTER SYMPTOMS
BACK PAIN: 0
EYE REDNESS: 1
COLOR CHANGE: 0
SHORTNESS OF BREATH: 0
ABDOMINAL PAIN: 0
EYE PAIN: 1

## 2023-04-21 ASSESSMENT — LIFESTYLE VARIABLES
HOW MANY STANDARD DRINKS CONTAINING ALCOHOL DO YOU HAVE ON A TYPICAL DAY: PATIENT DOES NOT DRINK
HOW OFTEN DO YOU HAVE A DRINK CONTAINING ALCOHOL: NEVER

## 2023-04-21 NOTE — ED PROVIDER NOTES
Behavior: Behavior normal.         Thought Content: Thought content does not include homicidal or suicidal ideation. MEDICAL DECISION MAKING / ED COURSE:   54-year-old male presents for left eye complaint. On initial exam patient in no acute distress, vitals are stable, he is noted have some injection of the left eye vision intact      1)  Number and Complexity of Problems Addressed at this Encounter  Problem List This Visit: Left eye complaint    Differential Diagnosis: Conjunctivitis, iritis, foreign body    Diagnoses Considered but Do Not Suspect: Angle-closure    Pertinent Comorbid Conditions: Does wear contacts and glasses      3)  Treatment and Disposition         We will check pressures, fluorescein staining    Visual acuity was 20/20 in right eye, 20/20 in the left, 20/20 with both eyes with correction with glasses    Patient was found to have a pressure of the left eye 32 right eye was at 17, no obvious large abrasion was seen he did have a little bit of uptake on the sclera of the left eye    Did discuss with ophthalmology Dr. Kezia Calero, who would like patient to be discharged from the ER and come to his office and he will see him urgently today does not want any further tests or medications done at this time    Results were discussed with patient discussed concern for mild elevated pressure in the left eye discussed need for follow-up after discharge and go directly to the ophthalmology office, patient voiced understanding and is in agreement with plan    Patient/Guardian was informed of their diagnosis and told to follow up with PCP & ophthalmology in 1-3 days. Patient demonstrates understanding and agreement with the plan. They were given the opportunity to ask questions and those questions were answered to the best of our ability with the available information. Patient/Guardian told to return to the ED for any new, worsening, changing or persistent symptoms.        This dictation was prepared

## 2023-04-21 NOTE — ED TRIAGE NOTES
Mode of arrival (squad #, walk in, police, etc) : walk in        Chief complaint(s): eye issues        Arrival Note (brief scenario, treatment PTA, etc). : Pt arrives to ED c/o left eye pain. Pt states he noticed yesterday that his left eye was cloudy. He says its not cloudy today, just irritated and painful. Pt is A&Ox4, eupneic, PWD. GCS=15. Call light in reach. C= \"Have you ever felt that you should Cut down on your drinking? \"  No  A= \"Have people Annoyed you by criticizing your drinking? \"  No  G= \"Have you ever felt bad or Guilty about your drinking? \"  No  E= \"Have you ever had a drink as an Eye-opener first thing in the morning to steady your nerves or to help a hangover? \"  No      Deferred []      Reason for deferring: N/A    *If yes to two or more: probable alcohol abuse. *

## 2024-02-01 ENCOUNTER — E-VISIT (OUTPATIENT)
Dept: FAMILY MEDICINE CLINIC | Age: 49
End: 2024-02-01
Payer: COMMERCIAL

## 2024-02-01 DIAGNOSIS — J06.9 ACUTE URI: Primary | ICD-10-CM

## 2024-02-01 PROCEDURE — 99422 OL DIG E/M SVC 11-20 MIN: CPT | Performed by: NURSE PRACTITIONER

## 2024-02-01 RX ORDER — METHYLPREDNISOLONE 4 MG/1
TABLET ORAL
Qty: 1 KIT | Refills: 0 | Status: SHIPPED | OUTPATIENT
Start: 2024-02-01 | End: 2024-02-07

## 2024-02-01 RX ORDER — AZITHROMYCIN 250 MG/1
250 TABLET, FILM COATED ORAL SEE ADMIN INSTRUCTIONS
Qty: 6 TABLET | Refills: 0 | Status: SHIPPED | OUTPATIENT
Start: 2024-02-01 | End: 2024-02-06

## 2024-02-01 ASSESSMENT — LIFESTYLE VARIABLES: SMOKING_STATUS: NO, I'VE NEVER SMOKED

## 2024-02-01 NOTE — PROGRESS NOTES
Patient presents today with complaints of nasal and sinus congestion and pressure.  He is having some discharge as well.  Denies any fever chills or bodyaches.  Informed that it would be beneficial to take a COVID test to rule this out as he is in the window for treatment.  Continue over-the-counter medication.  Patient presents questing a Z-Bogdan, I will place an order for this but he should wait a couple days before starting it to see if symptoms improve with over-the-counter medication as this is likely something viral.  Time spent reviewing chart and Bridj[employer] messages about 15 minutes.    Electronically signed by KRISTIE Izquierdo CNP on 2/1/2024 at 10:41 AM

## 2024-04-22 ENCOUNTER — TELEPHONE (OUTPATIENT)
Dept: FAMILY MEDICINE CLINIC | Age: 49
End: 2024-04-22

## 2024-04-22 DIAGNOSIS — M54.6 CHRONIC BILATERAL THORACIC BACK PAIN: Primary | ICD-10-CM

## 2024-04-22 DIAGNOSIS — G89.29 CHRONIC BILATERAL THORACIC BACK PAIN: Primary | ICD-10-CM

## 2024-04-22 NOTE — TELEPHONE ENCOUNTER
Order placed, most likely musculoskeletal pain    To make an appointment, I did order his x-ray     Diagnosis Orders   1. Chronic bilateral thoracic back pain  XR THORACIC SPINE (3 VIEWS)           No future appointments.

## 2024-04-23 ENCOUNTER — TELEPHONE (OUTPATIENT)
Dept: INTERNAL MEDICINE CLINIC | Age: 49
End: 2024-04-23

## 2024-04-23 DIAGNOSIS — M54.2 CHRONIC NECK PAIN: Primary | ICD-10-CM

## 2024-04-23 DIAGNOSIS — G89.29 CHRONIC NECK PAIN: Primary | ICD-10-CM

## 2024-04-23 ASSESSMENT — PATIENT HEALTH QUESTIONNAIRE - PHQ9
SUM OF ALL RESPONSES TO PHQ9 QUESTIONS 1 & 2: 0
SUM OF ALL RESPONSES TO PHQ QUESTIONS 1-9: 0
SUM OF ALL RESPONSES TO PHQ QUESTIONS 1-9: 0
2. FEELING DOWN, DEPRESSED OR HOPELESS: NOT AT ALL
2. FEELING DOWN, DEPRESSED OR HOPELESS: NOT AT ALL
SUM OF ALL RESPONSES TO PHQ9 QUESTIONS 1 & 2: 0
SUM OF ALL RESPONSES TO PHQ QUESTIONS 1-9: 0
1. LITTLE INTEREST OR PLEASURE IN DOING THINGS: NOT AT ALL
1. LITTLE INTEREST OR PLEASURE IN DOING THINGS: NOT AT ALL
SUM OF ALL RESPONSES TO PHQ QUESTIONS 1-9: 0

## 2024-04-23 NOTE — TELEPHONE ENCOUNTER
Noted. Thank you!  Same answer, I will add an x-ray cervical spine as well      elephone  4/22/2024  [facility] Shilpa Trivedi MD  Family Medicine Chronic bilateral thoracic back pain  Dx Back Pain  Reason for call     All Conversations: Back Pain  (Oldest Message First)  April 22, 2024  Анна Santizo MA     BS    4/22/24 11:28 AM  Note     PATIENTS WIFE CALLED IN STATING HE HAS BEEN HAVING UPPER BACK PAIN AROUND NECK BETWEEN SHOULDER BLADES HAS BEEN GOING ON FOR ABOUT 3 MONTHS      REALLY BAD THE PAST 2 DAYS      PATIENT STATES NO TO TAKING ANYTHING FOR PAIN.        WAS WONDERING ABOUT GETTING AN XRAY DONE                       SS    4/22/24 11:28 AM  Bianka Rivera (Emergency Contact) contacted Анна Santizo MA        BS    4/22/24 11:28 AM  Анна Santizo MA routed this conversation to Me  Me         4/22/24 12:08 PM  Note     Order placed, most likely musculoskeletal pain     To make an appointment, I did order his x-ray       Diagnosis Orders   1. Chronic bilateral thoracic back pain  XR THORACIC SPINE (3 VIEWS)             No future appointments.                    4/22/24 12:08 PM  You routed this conversation to pn [facility]      Анна Santizo MA   to Me   BS    4/22/24  1:07 PM  LEFT A DETAILED MESSAGE REGARDING XRAY AND TO CALL AND SCHEDULE APPOINTMENT        Me         4/22/24  1:09 PM  Note     Noted. Thank you!     No future appointments.               Future Appointments   Date Time Provider Department Center   4/26/2024 11:00 AM Shilpa Pham MD fp [facility]

## 2024-04-23 NOTE — TELEPHONE ENCOUNTER
Spouse calling in today to request a Cervical xray since he is having a lot of pain in his neck.    Please advise and route to clinical staff.

## 2024-04-23 NOTE — TELEPHONE ENCOUNTER
Shoulder blades is thoracic area, I ordered x-ray thoracic spine yesterday   Diagnosis Orders   1. Chronic neck pain  XR CERVICAL SPINE (2-3 VIEWS)           Future Appointments   Date Time Provider Department Center   4/26/2024 11:00 AM Shilpa Pham MD fp [facility]

## 2024-04-24 ENCOUNTER — HOSPITAL ENCOUNTER (OUTPATIENT)
Dept: GENERAL RADIOLOGY | Age: 49
Discharge: HOME | End: 2024-04-26
Payer: COMMERCIAL

## 2024-04-24 ENCOUNTER — HOSPITAL ENCOUNTER (OUTPATIENT)
Age: 49
Discharge: HOME | End: 2024-04-26
Payer: COMMERCIAL

## 2024-04-24 DIAGNOSIS — G89.29 CHRONIC BILATERAL THORACIC BACK PAIN: ICD-10-CM

## 2024-04-24 DIAGNOSIS — G89.29 CHRONIC NECK PAIN: ICD-10-CM

## 2024-04-24 DIAGNOSIS — M54.2 CHRONIC NECK PAIN: ICD-10-CM

## 2024-04-24 DIAGNOSIS — M54.6 CHRONIC BILATERAL THORACIC BACK PAIN: ICD-10-CM

## 2024-04-24 PROCEDURE — 72040 X-RAY EXAM NECK SPINE 2-3 VW: CPT

## 2024-04-24 PROCEDURE — 72072 X-RAY EXAM THORAC SPINE 3VWS: CPT

## 2024-04-25 PROBLEM — M51.34 DDD (DEGENERATIVE DISC DISEASE), THORACIC: Status: ACTIVE | Noted: 2024-04-25

## 2024-04-25 PROBLEM — M50.30 DDD (DEGENERATIVE DISC DISEASE), CERVICAL: Status: ACTIVE | Noted: 2024-04-25

## 2024-04-26 ENCOUNTER — OFFICE VISIT (OUTPATIENT)
Dept: FAMILY MEDICINE CLINIC | Age: 49
End: 2024-04-26
Payer: COMMERCIAL

## 2024-04-26 VITALS
WEIGHT: 202.6 LBS | DIASTOLIC BLOOD PRESSURE: 70 MMHG | SYSTOLIC BLOOD PRESSURE: 116 MMHG | BODY MASS INDEX: 30.01 KG/M2 | HEART RATE: 64 BPM | OXYGEN SATURATION: 96 % | TEMPERATURE: 97.6 F | HEIGHT: 69 IN

## 2024-04-26 DIAGNOSIS — R53.82 CHRONIC FATIGUE: ICD-10-CM

## 2024-04-26 DIAGNOSIS — E55.9 VITAMIN D DEFICIENCY: ICD-10-CM

## 2024-04-26 DIAGNOSIS — Z11.59 ENCOUNTER FOR SCREENING FOR OTHER VIRAL DISEASES: ICD-10-CM

## 2024-04-26 DIAGNOSIS — Z13.6 SCREENING FOR CARDIOVASCULAR CONDITION: ICD-10-CM

## 2024-04-26 DIAGNOSIS — G89.29 CHRONIC NECK PAIN: Primary | ICD-10-CM

## 2024-04-26 DIAGNOSIS — M50.30 DDD (DEGENERATIVE DISC DISEASE), CERVICAL: ICD-10-CM

## 2024-04-26 DIAGNOSIS — M54.2 CHRONIC NECK PAIN: Primary | ICD-10-CM

## 2024-04-26 DIAGNOSIS — M51.34 DDD (DEGENERATIVE DISC DISEASE), THORACIC: ICD-10-CM

## 2024-04-26 DIAGNOSIS — Z23 ENCOUNTER FOR IMMUNIZATION: ICD-10-CM

## 2024-04-26 DIAGNOSIS — Z12.5 PROSTATE CANCER SCREENING: ICD-10-CM

## 2024-04-26 PROBLEM — K63.5 HYPERPLASTIC POLYP OF SIGMOID COLON: Status: RESOLVED | Noted: 2022-05-23 | Resolved: 2024-04-26

## 2024-04-26 PROBLEM — D17.5 LIPOMA OF COLON: Status: RESOLVED | Noted: 2022-05-23 | Resolved: 2024-04-26

## 2024-04-26 PROBLEM — Z98.890 STATUS POST COLONOSCOPY: Status: RESOLVED | Noted: 2022-05-23 | Resolved: 2024-04-26

## 2024-04-26 PROBLEM — S62.318A FRACTURE OF BASE OF FIFTH METACARPAL BONE: Status: RESOLVED | Noted: 2022-01-20 | Resolved: 2024-04-26

## 2024-04-26 PROCEDURE — 1036F TOBACCO NON-USER: CPT | Performed by: FAMILY MEDICINE

## 2024-04-26 PROCEDURE — G8419 CALC BMI OUT NRM PARAM NOF/U: HCPCS | Performed by: FAMILY MEDICINE

## 2024-04-26 PROCEDURE — 3017F COLORECTAL CA SCREEN DOC REV: CPT | Performed by: FAMILY MEDICINE

## 2024-04-26 PROCEDURE — G8427 DOCREV CUR MEDS BY ELIG CLIN: HCPCS | Performed by: FAMILY MEDICINE

## 2024-04-26 PROCEDURE — 90715 TDAP VACCINE 7 YRS/> IM: CPT | Performed by: FAMILY MEDICINE

## 2024-04-26 PROCEDURE — 90471 IMMUNIZATION ADMIN: CPT | Performed by: FAMILY MEDICINE

## 2024-04-26 PROCEDURE — 99214 OFFICE O/P EST MOD 30 MIN: CPT | Performed by: FAMILY MEDICINE

## 2024-04-26 RX ORDER — BACLOFEN 10 MG/1
10 TABLET ORAL 3 TIMES DAILY PRN
Qty: 90 TABLET | Refills: 0 | Status: SHIPPED | OUTPATIENT
Start: 2024-04-26

## 2024-04-26 RX ORDER — ERGOCALCIFEROL 1.25 MG/1
50000 CAPSULE ORAL WEEKLY
Qty: 12 CAPSULE | Refills: 0 | Status: SHIPPED | OUTPATIENT
Start: 2024-04-26

## 2024-04-26 RX ORDER — IBUPROFEN 800 MG/1
800 TABLET ORAL
COMMUNITY

## 2024-04-26 RX ORDER — YEAST,DRIED (S. CEREVISIAE)
1 POWDER (GRAM) ORAL DAILY
Qty: 90 TABLET | Refills: 0
Start: 2024-04-26

## 2024-04-26 SDOH — ECONOMIC STABILITY: FOOD INSECURITY: WITHIN THE PAST 12 MONTHS, THE FOOD YOU BOUGHT JUST DIDN'T LAST AND YOU DIDN'T HAVE MONEY TO GET MORE.: NEVER TRUE

## 2024-04-26 SDOH — ECONOMIC STABILITY: HOUSING INSECURITY
IN THE LAST 12 MONTHS, WAS THERE A TIME WHEN YOU DID NOT HAVE A STEADY PLACE TO SLEEP OR SLEPT IN A SHELTER (INCLUDING NOW)?: NO

## 2024-04-26 SDOH — ECONOMIC STABILITY: FOOD INSECURITY: WITHIN THE PAST 12 MONTHS, YOU WORRIED THAT YOUR FOOD WOULD RUN OUT BEFORE YOU GOT MONEY TO BUY MORE.: NEVER TRUE

## 2024-04-26 SDOH — ECONOMIC STABILITY: INCOME INSECURITY: HOW HARD IS IT FOR YOU TO PAY FOR THE VERY BASICS LIKE FOOD, HOUSING, MEDICAL CARE, AND HEATING?: NOT HARD AT ALL

## 2024-04-26 ASSESSMENT — ENCOUNTER SYMPTOMS
NAUSEA: 0
ABDOMINAL DISTENTION: 0
VOMITING: 0
WHEEZING: 0
CHEST TIGHTNESS: 0
DIARRHEA: 0
ABDOMINAL PAIN: 0
CONSTIPATION: 0
COUGH: 0
SHORTNESS OF BREATH: 0

## 2024-04-26 NOTE — RESULT ENCOUNTER NOTE
Juana comment sent to patient.  Will discuss tomorrow at appointment  Minimal degenerative changes  Future Appointments  4/26/2024  11:00 AM   [facility]    [facility]

## 2024-04-26 NOTE — PROGRESS NOTES
Visit Information    Have you changed or started any medications since your last visit including any over-the-counter medicines, vitamins, or herbal medicines? no   Have you stopped taking any of your medications? Is so, why? -  no  Are you having any side effects from any of your medications? - no    Have you seen any other physician or provider since your last visit?  no   Have you had any other diagnostic tests since your last visit?  no   Have you been seen in the emergency room and/or had an admission in a hospital since we last saw you?  no   Have you had your routine dental cleaning in the past 6 months?  YES    Do you have an active MyChart account? If no, what is the barrier?  Yes    Patient Care Team:  Shilpa Pham MD as PCP - General (Family Medicine)  Shilpa Pham MD as PCP - Empaneled Provider  James Collins MD as Surgeon (Orthopedic Surgery)    Medical History Review  Past Medical, Family, and Social History reviewed and does contribute to the patient presenting condition    Health Maintenance   Topic Date Due    Hepatitis B vaccine (1 of 3 - 3-dose series) Never done    DTaP/Tdap/Td vaccine (1 - Tdap) Never done    COVID-19 Vaccine (4 - 2023-24 season) 09/01/2023    Shingles vaccine (1 of 2) Never done    Flu vaccine (Season Ended) 08/01/2024    Depression Screen  04/23/2025    Lipids  01/24/2027    Colorectal Cancer Screen  03/22/2027    Hepatitis C screen  Completed    HIV screen  Completed    Hepatitis A vaccine  Aged Out    Hib vaccine  Aged Out    Polio vaccine  Aged Out    Meningococcal (ACWY) vaccine  Aged Out    Pneumococcal 0-64 years Vaccine  Aged Out    Diabetes screen  Discontinued             
tablet, R-0NO PRINT  5. Chronic fatigue  Ongoing  Improve sleep, neck pain interferes with his sleep  Will do basic labs to rule out certain common medical conditions: hematologic, renal, hepatic, electrolyte imbalances, thyroid disorders.   -     CBC with Auto Differential; Future  -     Comprehensive Metabolic Panel; Future  -     TSH; Future  6. Prostate cancer screening  -     PSA Screening; Future  The natural history of prostate cancer and ongoing controversy regarding screening and potential treatment outcomes of prostate cancer has been discussed with the patient. The meaning of a false positive PSA and a false negative PSA has been discussed. He indicates understanding of the limitations of this screening test and wishes to proceed with screening PSA testing.    7. Screening for cardiovascular condition  -     Lipid Panel; Future  8. Encounter for screening for other viral diseases  -     Hepatitis B Surface Antibody; Future  9. Encounter for immunization  -     Tdap, BOOSTRIX, (age 10 yrs+), MARTINE Melgar received counseling on the following healthy behaviors: nutrition, exercise, medication adherence, and weight loss    Reviewed prior labs and health maintenance  Discussed use, benefit, and side effects of prescribed medications.   Barriers to medication compliance addressed.   Patient given educational materials - see patient instructions  All patient questions answered.  Patient voiced understanding.  The patient's past medical,surgical, social, and family history as well as his current medications and allergies were reviewed as documented in today's encounter.    Medications, labs, diagnostic studies, consultations and follow-up as documented in this encounter.    Return in about 1 year (around 4/26/2025) for Visit type PHYSICAL, VISION screen, PHQ9..    Data Unavailable    Future Appointments   Date Time Provider Department Center   4/29/2025 10:30 AM Shilpa Pham MD fp [facility]

## 2024-04-26 NOTE — RESULT ENCOUNTER NOTE
Juana comment sent to patient.  Will discuss tomorrow at appointment  X-ray of poor quality, however no major issues found    Future Appointments  4/26/2024  11:00 AM   [facility]

## (undated) DEVICE — GLOVE ORTHO 8   MSG9480

## (undated) DEVICE — ENDO KIT W/SYRINGE: Brand: MEDLINE INDUSTRIES, INC.

## (undated) DEVICE — DEFENDO AIR WATER SUCTION AND BIOPSY VALVE KIT FOR  OLYMPUS: Brand: DEFENDO AIR/WATER/SUCTION AND BIOPSY VALVE